# Patient Record
Sex: MALE | Race: WHITE | Employment: OTHER | URBAN - METROPOLITAN AREA
[De-identification: names, ages, dates, MRNs, and addresses within clinical notes are randomized per-mention and may not be internally consistent; named-entity substitution may affect disease eponyms.]

---

## 2017-01-05 ENCOUNTER — HOSPITAL ENCOUNTER (EMERGENCY)
Age: 77
Discharge: HOME | End: 2017-01-05
Attending: EMERGENCY MEDICINE
Payer: MEDICARE

## 2017-01-05 VITALS
SYSTOLIC BLOOD PRESSURE: 151 MMHG | BODY MASS INDEX: 30.63 KG/M2 | HEIGHT: 66 IN | TEMPERATURE: 97.5 F | DIASTOLIC BLOOD PRESSURE: 70 MMHG | WEIGHT: 190.6 LBS | RESPIRATION RATE: 16 BRPM | HEART RATE: 59 BPM | OXYGEN SATURATION: 96 %

## 2017-01-05 DIAGNOSIS — L29.9 GENERALIZED PRURITUS: Primary | ICD-10-CM

## 2017-01-05 PROCEDURE — 99282 EMERGENCY DEPT VISIT SF MDM: CPT

## 2017-01-05 RX ORDER — HYDROXYZINE 50 MG/1
50 TABLET, FILM COATED ORAL
Qty: 20 TAB | Refills: 0 | Status: SHIPPED | OUTPATIENT
Start: 2017-01-05 | End: 2017-01-15

## 2017-01-05 NOTE — DISCHARGE INSTRUCTIONS
We hope that we have addressed all of your medical concerns. The examination and treatment you received in the Emergency Department were for an emergent problem and were not intended as complete care. It is important that you follow up with your healthcare provider(s) for ongoing care. If your symptoms worsen or do not improve as expected, and you are unable to reach your usual health care provider(s), you should return to the Emergency Department. Today's healthcare is undergoing tremendous change, and patient satisfaction surveys are one of the many tools to assess the quality of medical care. You may receive a survey from the [employer] regarding your experience in the Emergency Department. I hope that your experience has been completely positive, particularly the medical care that I provided. As such, please participate in the survey; anything less than excellent does not meet my expectations or intentions. [facility] and [employer] participate in nationally recognized quality of care measures. If your blood pressure is greater than 120/80, as reported below, we urge that you seek medical care to address the potential of high blood pressure, commonly known as hypertension. Hypertension can be hereditary or can be caused by certain medical conditions, pain, stress, or \"white coat syndrome. \"       Please make an appointment with your health care provider(s) for follow up of your Emergency Department visit. VITALS:   Patient Vitals for the past 8 hrs:   Temp Pulse Resp BP SpO2   01/05/17 0323 97.5 °F (36.4 °C) (!) 59 16 151/70 96 %          Thank you for allowing us to provide you with medical care today. We realize that you have many choices for your emergency care needs. Please choose us in the future for any continued health care needs. Regards,           April LETTY Young, [facility]. Office: [phone]            No results found for this or any previous visit (from the past 24 hour(s)). No results found.

## 2017-01-05 NOTE — ED TRIAGE NOTES
Patient arrives to ED with c/o itching to his arm and torso, patient states the itching started @ 2 days ago, patient reports no new medications, also reports no new soap, or detergents, no other complaint noted.

## 2017-01-05 NOTE — ED PROVIDER NOTES
HPI Comments: This is a 77 yo  male with medical hx remarkable for arthritis, atrial fibrillation, colon polyps, depression, DM, GERD, hyperlipidemia, Vitamin D deficiency, BRITTANY and seizure disorder presenting ambulatory to the ED with complaint of a three day history of pruritis to the arms, back, chest and abdomen. Denies any recognized triggers. No new soaps, detergents, medications or recent travel. Has tried cortaid cream with limited relief. Improves with showers. No rash. No fever, headache, sore throat, cough, rhinorrhea, sneezing, SOB, abdominal pain, nausea, vomiting, or urinary complaints. Patient is a 78 y.o. male presenting with allergic reaction. The history is provided by the patient. Allergic Reaction    Pertinent negatives include no vomiting, no confusion and no shortness of breath. Past Medical History:   Diagnosis Date    Arrhythmia     Arthritis     Atrial fibrillation ([address]     Bladder spasms      PT UNAWARE OF THIS DIAGNOSIS ON 10/10/12    Colon polyp     Depression     DM (diabetes mellitus) ([address] 9/2/2011     DIET-CONTROLLED    GERD (gastroesophageal reflux disease) 6/21/2011     AFTER SPICY FOODS     Heart failure ([address]     HTN (hypertension) 6/21/2011    Hyperlipidemia 6/21/2011    Ill-defined condition      33 lb intentional wt loss over 18-24 months    Moderate vision loss of one eye      LEFT EYE VISION LOSS    BRITTANY (obstructive sleep apnea) 6/21/2011    Seizure disorder ([address] 10/10/12     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 rep 10 yrs.     Hx tonsillectomy      Hx heent       L EYE STRABISMUS CORRECTION    Hx hernia repair       UMBILICAL REPAIR WITH MESH    Vascular surgery procedure unlist  /P 2007     [facility] - vessels clear per pt         Family History:   Problem Relation Age of Onset    Heart Disease Mother     Diabetes Father     Heart Disease Father     Other Sister      Myasthenia Gravis    Heart Disease Brother     Heart Disease Maternal Grandmother     Heart Disease Maternal Grandfather        Social History     Social History    Marital status:      Spouse name: N/A    Number of children: N/A    Years of education: N/A     Occupational History    Not on file. Social History Main Topics    Smoking status: Former Smoker    Smokeless tobacco: Never Used      Comment: SMOKED A PIPE FOR 3-5 YEARS IN DISTANT PAST - over 30 yrs ago    Alcohol use 0.5 oz/week     1 Glasses of wine per week      Comment: weekly    Drug use: No    Sexual activity: Yes     Partners: Female     Other Topics Concern    Not on file     Social History Narrative         ALLERGIES: Ciprofloxacin; Penicillins; and Sulfa (sulfonamide antibiotics)    Review of Systems   Constitutional: Negative. Negative for chills and fever. HENT: Negative for congestion, ear pain, rhinorrhea, sore throat and voice change. Eyes: Negative. Negative for photophobia, pain and itching. Respiratory: Negative for cough, chest tightness and shortness of breath. Cardiovascular: Negative for chest pain and palpitations. Gastrointestinal: Negative for abdominal distention, abdominal pain, constipation, diarrhea and vomiting. Genitourinary: Negative for difficulty urinating, dysuria, frequency and urgency. Musculoskeletal: Negative for joint swelling and neck stiffness. Skin:        Itching     Neurological: Negative for weakness, numbness and headaches. Psychiatric/Behavioral: Negative for confusion and decreased concentration. All other systems reviewed and are negative.       Vitals:    01/05/17 0323   BP: 151/70   Pulse: (!) 59   Resp: 16   Temp: 97.5 °F (36.4 °C)   SpO2: 96%   Weight: 86.5 kg (190 lb 9.6 oz)   Height: 5' 6\" (1.676 m)            Physical Exam   Constitutional: He is oriented to person, place, and time. He appears well-developed and well-nourished. No distress.  male elderly in NAD   HENT:   Head: Normocephalic and atraumatic. Right Ear: External ear normal.   Left Ear: External ear normal.   Nose: Nose normal.   Mouth/Throat: Oropharynx is clear and moist. No oropharyngeal exudate. Eyes: Conjunctivae and EOM are normal. Pupils are equal, round, and reactive to light. Right eye exhibits no discharge. Left eye exhibits no discharge. Neck: Normal range of motion. Neck supple. Cardiovascular: Normal rate, regular rhythm and normal heart sounds. Pulmonary/Chest: Effort normal and breath sounds normal. He has no wheezes. He has no rales. Abdominal: Soft. Bowel sounds are normal. He exhibits no distension. There is no tenderness. There is no guarding. Musculoskeletal: Normal range of motion. Lymphadenopathy:     He has no cervical adenopathy. Neurological: He is alert and oriented to person, place, and time. No cranial nerve deficit. Skin: Skin is warm and dry. No rash noted. He is not diaphoretic. Seborrheic dermatitis to the back and abdomen  No lesions to web spaces, palms or soles    Psychiatric: He has a normal mood and affect. His behavior is normal.   Nursing note and vitals reviewed. MDM  Number of Diagnoses or Management Options  Generalized pruritus:   Diagnosis management comments: 77 yo  male with three day hx of generalized pruritis. No associated rash. Pt otherwise well appearing with relatively benign exam and stable vitals. Will treat with hydroxyzine. Pt has dermatology follow-up. [place], [place]      ED Course       Procedures         Progress note    Patient's results have been reviewed with them.   Patient and/or family have verbally conveyed their understanding and agreement of the patient's signs, symptoms, diagnosis, treatment and prognosis and additionally agree to follow up as recommended or return to the Emergency Room should their condition change prior to follow-up. Discharge instructions have also been provided to the patient with some educational information regarding their diagnosis as well a list of reasons why they would want to return to the ER prior to their follow-up appointment should their condition change. [facility], [address]    A/P  Generalized pruritis: Hydroxyzine as needed for itching. Follow-up with dermatology. Return for any new or worsening.  Coco CAMPOS [facility], [address]

## 2017-01-05 NOTE — ED NOTES
Pt discharged from department with prescription and follow up care instructions; pt verbalized understanding of discharge. VSS. NAD. Pt ambulatory from department with steady gait.

## 2017-05-19 PROBLEM — I15.2 HYPERTENSION COMPLICATING DIABETES (HCC): Status: ACTIVE | Noted: 2017-05-19

## 2017-05-19 PROBLEM — E11.59 HYPERTENSION COMPLICATING DIABETES (HCC): Status: ACTIVE | Noted: 2017-05-19

## 2017-07-31 PROBLEM — E03.9 ACQUIRED HYPOTHYROIDISM: Status: ACTIVE | Noted: 2017-07-31

## 2018-03-02 ENCOUNTER — HOSPITAL ENCOUNTER (OUTPATIENT)
Dept: GENERAL RADIOLOGY | Age: 78
Discharge: HOME | End: 2018-03-02
Attending: INTERNAL MEDICINE
Payer: MEDICARE

## 2018-03-02 DIAGNOSIS — E11.59 HYPERTENSION COMPLICATING DIABETES (HCC): ICD-10-CM

## 2018-03-02 DIAGNOSIS — I15.2 HYPERTENSION COMPLICATING DIABETES (HCC): ICD-10-CM

## 2018-03-02 PROBLEM — E11.21 TYPE 2 DIABETES WITH NEPHROPATHY (HCC): Status: ACTIVE | Noted: 2018-03-02

## 2018-03-02 PROCEDURE — 71046 X-RAY EXAM CHEST 2 VIEWS: CPT

## 2018-04-30 PROBLEM — Z79.899 ENCOUNTER FOR LONG-TERM (CURRENT) DRUG USE: Status: ACTIVE | Noted: 2018-04-30

## 2019-03-06 ENCOUNTER — HOSPITAL ENCOUNTER (OUTPATIENT)
Age: 79
Setting detail: OUTPATIENT SURGERY
Discharge: HOME | End: 2019-03-06
Attending: INTERNAL MEDICINE | Admitting: INTERNAL MEDICINE
Payer: MEDICARE

## 2019-03-06 ENCOUNTER — ANESTHESIA (OUTPATIENT)
Dept: ENDOSCOPY | Age: 79
End: 2019-03-06
Payer: MEDICARE

## 2019-03-06 ENCOUNTER — ANESTHESIA EVENT (OUTPATIENT)
Dept: ENDOSCOPY | Age: 79
End: 2019-03-06
Payer: MEDICARE

## 2019-03-06 VITALS
SYSTOLIC BLOOD PRESSURE: 142 MMHG | BODY MASS INDEX: 27.97 KG/M2 | OXYGEN SATURATION: 100 % | DIASTOLIC BLOOD PRESSURE: 75 MMHG | HEIGHT: 66 IN | WEIGHT: 174.06 LBS | TEMPERATURE: 97.7 F | HEART RATE: 72 BPM | RESPIRATION RATE: 13 BRPM

## 2019-03-06 LAB
GLUCOSE BLD STRIP.AUTO-MCNC: 171 MG/DL (ref 65–100)
SERVICE CMNT-IMP: ABNORMAL

## 2019-03-06 PROCEDURE — 74011250636 HC RX REV CODE- 250/636: Performed by: INTERNAL MEDICINE

## 2019-03-06 PROCEDURE — 88305 TISSUE EXAM BY PATHOLOGIST: CPT

## 2019-03-06 PROCEDURE — 74011250636 HC RX REV CODE- 250/636

## 2019-03-06 PROCEDURE — 77030027957 HC TBNG IRR ENDOGTR BUSS -B: Performed by: INTERNAL MEDICINE

## 2019-03-06 PROCEDURE — 76040000019: Performed by: INTERNAL MEDICINE

## 2019-03-06 PROCEDURE — 82962 GLUCOSE BLOOD TEST: CPT

## 2019-03-06 PROCEDURE — 77030013992 HC SNR POLYP ENDOSC BSC -B: Performed by: INTERNAL MEDICINE

## 2019-03-06 PROCEDURE — 76060000031 HC ANESTHESIA FIRST 0.5 HR: Performed by: INTERNAL MEDICINE

## 2019-03-06 RX ORDER — ATROPINE SULFATE 0.1 MG/ML
0.5 INJECTION INTRAVENOUS
Status: DISCONTINUED | OUTPATIENT
Start: 2019-03-06 | End: 2019-03-06 | Stop reason: HOSPADM

## 2019-03-06 RX ORDER — PROPOFOL 10 MG/ML
INJECTION, EMULSION INTRAVENOUS AS NEEDED
Status: DISCONTINUED | OUTPATIENT
Start: 2019-03-06 | End: 2019-03-06 | Stop reason: HOSPADM

## 2019-03-06 RX ORDER — EPINEPHRINE 0.1 MG/ML
1 INJECTION INTRACARDIAC; INTRAVENOUS
Status: DISCONTINUED | OUTPATIENT
Start: 2019-03-06 | End: 2019-03-06 | Stop reason: HOSPADM

## 2019-03-06 RX ORDER — SODIUM CHLORIDE 9 MG/ML
50 INJECTION, SOLUTION INTRAVENOUS CONTINUOUS
Status: DISCONTINUED | OUTPATIENT
Start: 2019-03-06 | End: 2019-03-06 | Stop reason: HOSPADM

## 2019-03-06 RX ORDER — NALOXONE HYDROCHLORIDE 0.4 MG/ML
0.4 INJECTION, SOLUTION INTRAMUSCULAR; INTRAVENOUS; SUBCUTANEOUS
Status: DISCONTINUED | OUTPATIENT
Start: 2019-03-06 | End: 2019-03-06 | Stop reason: HOSPADM

## 2019-03-06 RX ORDER — MIDAZOLAM HYDROCHLORIDE 1 MG/ML
.25-5 INJECTION, SOLUTION INTRAMUSCULAR; INTRAVENOUS
Status: DISCONTINUED | OUTPATIENT
Start: 2019-03-06 | End: 2019-03-06 | Stop reason: HOSPADM

## 2019-03-06 RX ORDER — FLUMAZENIL 0.1 MG/ML
0.2 INJECTION INTRAVENOUS
Status: DISCONTINUED | OUTPATIENT
Start: 2019-03-06 | End: 2019-03-06 | Stop reason: HOSPADM

## 2019-03-06 RX ORDER — FENTANYL CITRATE 50 UG/ML
100 INJECTION, SOLUTION INTRAMUSCULAR; INTRAVENOUS
Status: DISCONTINUED | OUTPATIENT
Start: 2019-03-06 | End: 2019-03-06 | Stop reason: HOSPADM

## 2019-03-06 RX ORDER — SODIUM CHLORIDE 0.9 % (FLUSH) 0.9 %
5-40 SYRINGE (ML) INJECTION AS NEEDED
Status: DISCONTINUED | OUTPATIENT
Start: 2019-03-06 | End: 2019-03-06 | Stop reason: HOSPADM

## 2019-03-06 RX ORDER — SODIUM CHLORIDE 9 MG/ML
INJECTION, SOLUTION INTRAVENOUS
Status: DISCONTINUED | OUTPATIENT
Start: 2019-03-06 | End: 2019-03-06 | Stop reason: HOSPADM

## 2019-03-06 RX ORDER — DEXTROMETHORPHAN/PSEUDOEPHED 2.5-7.5/.8
1.2 DROPS ORAL
Status: DISCONTINUED | OUTPATIENT
Start: 2019-03-06 | End: 2019-03-06 | Stop reason: HOSPADM

## 2019-03-06 RX ORDER — SODIUM CHLORIDE 0.9 % (FLUSH) 0.9 %
5-40 SYRINGE (ML) INJECTION EVERY 8 HOURS
Status: DISCONTINUED | OUTPATIENT
Start: 2019-03-06 | End: 2019-03-06 | Stop reason: HOSPADM

## 2019-03-06 RX ADMIN — PROPOFOL 30 MG: 10 INJECTION, EMULSION INTRAVENOUS at 11:02

## 2019-03-06 RX ADMIN — PROPOFOL 50 MG: 10 INJECTION, EMULSION INTRAVENOUS at 11:00

## 2019-03-06 RX ADMIN — PROPOFOL 50 MG: 10 INJECTION, EMULSION INTRAVENOUS at 11:12

## 2019-03-06 RX ADMIN — PROPOFOL 40 MG: 10 INJECTION, EMULSION INTRAVENOUS at 11:15

## 2019-03-06 RX ADMIN — PROPOFOL 50 MG: 10 INJECTION, EMULSION INTRAVENOUS at 11:07

## 2019-03-06 RX ADMIN — PROPOFOL 50 MG: 10 INJECTION, EMULSION INTRAVENOUS at 10:59

## 2019-03-06 RX ADMIN — PROPOFOL 20 MG: 10 INJECTION, EMULSION INTRAVENOUS at 11:04

## 2019-03-06 RX ADMIN — PROPOFOL 30 MG: 10 INJECTION, EMULSION INTRAVENOUS at 11:08

## 2019-03-06 RX ADMIN — PROPOFOL 50 MG: 10 INJECTION, EMULSION INTRAVENOUS at 11:06

## 2019-03-06 RX ADMIN — SODIUM CHLORIDE: 9 INJECTION, SOLUTION INTRAVENOUS at 10:59

## 2019-03-06 RX ADMIN — PROPOFOL 30 MG: 10 INJECTION, EMULSION INTRAVENOUS at 11:14

## 2019-03-06 NOTE — PROCEDURES
[address]         Procedure:  Colonoscopy    :  Se Israel MD    Surgical Assistant: None    Implants: None    Referring Provider: Claudeen Marvel, MD    Sedation:  MAC anesthesia Propofol      Prior to the procedure its objectives, risks, consequences and alternatives were discussed with the patient who then elected to proceed. The patient had the opportunity to ask questions and those questions were answered. A physical exam was performed. The heart, lungs, and mental status were examined prior to the procedure and found to be satisfactory for conscious sedation and for the procedure. Conscious sedation was initiated by the physician. Continuous pulse oximetry and blood pressure monitoring were used throughout the procedure. After appropriate analgesia and rectal exam, the colonoscope was passed into the anus and passed to the cecum without difficulty. The prep was fair. On slow withdrawal of the scope, there was an 8 mm sessile polyp in the cecum. The polyp was removed with snare and cautery and removed. The ascending, colon, hepatic flexure, transverse colon, splenic flexure and descending colon were normal. In the sigmoid there were moderate diverticulosis. The rectum was normal. Retroflexion exam of the rectum revealed internal hemorrhoids, which is probably the source of the bleeding. He tolerated the procedure without complication and I recommend a repeat colonoscopy in 5 years. Specimen Removed:  Cecal polyp    Complications: None. EBL:  None.         Se Israel MD  3/6/2019  11:20 AM

## 2019-03-06 NOTE — DISCHARGE INSTRUCTIONS
[address]  [address]          [place]  [id number]  1940    COLON DISCHARGE INSTRUCTIONS    DISCOMFORT:  Redness at IV site- apply warm compress to area; if redness or soreness persist- contact your physician  There may be a slight amount of blood passed from the rectum  Gaseous discomfort- walking, belching will help relieve any discomfort  You may not operate a vehicle for 12 hours  You may not engage in an occupation involving machinery or appliances for rest of today  You may not drink alcoholic beverages for at least 12 hours  Avoid making any critical decisions for at least 24 hour  DIET:   Regular diet. - however -  remember your colon is empty and a heavy meal will produce gas. Avoid these foods:  vegetables, fried / greasy foods, carbonated drinks for today         ACTIVITY:  You may resume your normal daily activities it is recommended that you spend the remainder of the day resting -  avoid any strenuous activity. CALL M.D. ANY SIGN OF:   Increasing pain, nausea, vomiting  Abdominal distension (swelling)  New increased bleeding (oral or rectal)  Fever (chills)  Pain in chest area  Bloody discharge from nose or mouth  Shortness of breath     Follow-up Instructions:   Call Dr. Norma Retana for any questions or problems. Telephone # [phone]  Biopsy results will be available in  5 to 7 days  Should have a repeat colonoscopy in 5 years    Impression:  diverticulosis, cecal polyp, hemorrhoids      Patient Education        Diverticulosis: Care Instructions  Your Care Instructions  In diverticulosis, pouches called diverticula form in the wall of the large intestine (colon). The pouches do not cause any pain or other symptoms. Most people who have diverticulosis do not know they have it. But the pouches sometimes bleed, and if they become infected, they can cause pain and other symptoms.  When this happens, it is called diverticulitis. Diverticula form when pressure pushes the wall of the colon outward at certain weak points. A diet that is too low in fiber can cause diverticula. Follow-up care is a key part of your treatment and safety. Be sure to make and go to all appointments, and call your doctor if you are having problems. It's also a good idea to know your test results and keep a list of the medicines you take. How can you care for yourself at home? · Include fruits, leafy green vegetables, beans, and whole grains in your diet each day. These foods are high in fiber. · Take a fiber supplement, such as Citrucel or Metamucil, every day if needed. Read and follow all instructions on the label. · Drink plenty of fluids, enough so that your urine is light yellow or clear like water. If you have kidney, heart, or liver disease and have to limit fluids, talk with your doctor before you increase the amount of fluids you drink. · Get at least 30 minutes of exercise on most days of the week. Walking is a good choice. You also may want to do other activities, such as running, swimming, cycling, or playing tennis or team sports. · Cut out foods that cause gas, pain, or other symptoms. When should you call for help? Call your doctor now or seek immediate medical care if:    · You have belly pain.     · You pass maroon or very bloody stools.     · You have a fever.     · You have nausea and vomiting.     · You have unusual changes in your bowel movements or abdominal swelling.     · You have burning pain when you urinate.     · You have abnormal vaginal discharge.     · You have shoulder pain.     · You have cramping pain that does not get better when you have a bowel movement or pass gas.     · You pass gas or stool from your urethra while urinating.    Watch closely for changes in your health, and be sure to contact your doctor if you have any problems. Where can you learn more?   Go to http://edith-marisol.info/. Enter N987 in the search box to learn more about \"Diverticulosis: Care Instructions. \"  Current as of: March 27, 2018  Content Version: 11.9  © 2006-2018 [employer]. Care instructions adapted under license by [employer] (which disclaims liability or warranty for this information). If you have questions about a medical condition or this instruction, always ask your healthcare professional. [place] [address] any warranty or liability for your use of this information. Patient Education        Hemorrhoids: Care Instructions  Your Care Instructions    Hemorrhoids are enlarged veins that develop in the anal canal. Bleeding during bowel movements, itching, swelling, and rectal pain are the most common symptoms. They can be uncomfortable at times, but hemorrhoids rarely are a serious problem. You can treat most hemorrhoids with simple changes to your diet and bowel habits. These changes include eating more fiber and not straining to pass stools. Most hemorrhoids do not need surgery or other treatment unless they are very large and painful or bleed a lot. Follow-up care is a key part of your treatment and safety. Be sure to make and go to all appointments, and call your doctor if you are having problems. It's also a good idea to know your test results and keep a list of the medicines you take. How can you care for yourself at home? · Sit in a few inches of warm water (sitz bath) 3 times a day and after bowel movements. The warm water helps with pain and itching. · Put ice on your anal area several times a day for 10 minutes at a time. Put a thin cloth between the ice and your skin. Follow this by placing a warm, wet towel on the area for another 10 to 20 minutes. · Take pain medicines exactly as directed. ? If the doctor gave you a prescription medicine for pain, take it as prescribed.   ? If you are not taking a prescription pain medicine, ask your doctor if you can take an over-the-counter medicine. · Keep the anal area clean, but be gentle. Use water and a fragrance-free soap, such as [place], or use baby wipes or medicated pads, such as Tucks. · Wear cotton underwear and loose clothing to decrease moisture in the anal area. · Eat more fiber. Include foods such as whole-grain breads and cereals, raw vegetables, raw and dried fruits, and beans. · Drink plenty of fluids, enough so that your urine is light yellow or clear like water. If you have kidney, heart, or liver disease and have to limit fluids, talk with your doctor before you increase the amount of fluids you drink. · Use a stool softener that contains bran or psyllium. You can save money by buying bran or psyllium (available in bulk at most health food stores) and sprinkling it on foods or stirring it into fruit juice. Or you can use a product such as Metamucil or Hydrocil. · Practice healthy bowel habits. ? Go to the bathroom as soon as you have the urge. ? Avoid straining to pass stools. Relax and give yourself time to let things happen naturally. ? Do not hold your breath while passing stools. ? Do not read while sitting on the toilet. Get off the toilet as soon as you have finished. · Take your medicines exactly as prescribed. Call your doctor if you think you are having a problem with your medicine. When should you call for help? Call 911 anytime you think you may need emergency care. For example, call if:    · You pass maroon or very bloody stools.    Call your doctor now or seek immediate medical care if:    · You have increased pain.     · You have increased bleeding.    Watch closely for changes in your health, and be sure to contact your doctor if:    · Your symptoms have not improved after 3 or 4 days. Where can you learn more? Go to http://edith-marisol.info/.   Enter F228 in the search box to learn more about \"Hemorrhoids: Care Instructions. \"  Current as of: March 27, 2018  Content Version: 11.9  © 8464-1944 [employer]. Care instructions adapted under license by [employer] (which disclaims liability or warranty for this information). If you have questions about a medical condition or this instruction, always ask your healthcare professional. [address] any warranty or liability for your use of this information. Patient Education        Colon Polyps: Care Instructions  Your Care Instructions    Colon polyps are growths in the colon or the rectum. The cause of most colon polyps is not known, and most people who get them do not have any problems. But a certain kind can turn into cancer. For this reason, regular testing for colon polyps is important for people age 48 and older and anyone who has an increased risk for colon cancer. Polyps are usually found through routine colon cancer screening tests. Although most colon polyps are not cancerous, they are usually removed and then tested for cancer. Screening for colon cancer saves lives because the cancer can usually be cured if it is caught early. If you have a polyp that is the type that can turn into cancer, you may need more tests to examine your entire colon. The doctor will remove any other polyps that he or she finds, and you will be tested more often. Follow-up care is a key part of your treatment and safety. Be sure to make and go to all appointments, and call your doctor if you are having problems. It's also a good idea to know your test results and keep a list of the medicines you take. How can you care for yourself at home? Regular exams to look for colon polyps are the best way to prevent polyps from turning into colon cancer. These can include stool tests, sigmoidoscopy, colonoscopy, and CT colonography. Talk with your doctor about a testing schedule that is right for you.   To prevent polyps  There is no home treatment that can prevent colon polyps. But these steps may help lower your risk for cancer. · Stay active. Being active can help you get to and stay at a healthy weight. Try to exercise on most days of the week. Walking is a good choice. · Eat well. Choose a variety of vegetables, fruits, legumes (such as peas and beans), fish, poultry, and whole grains. · Do not smoke. If you need help quitting, talk to your doctor about stop-smoking programs and medicines. These can increase your chances of quitting for good. · If you drink alcohol, limit how much you drink. Limit alcohol to 2 drinks a day for men and 1 drink a day for women. When should you call for help? Call your doctor now or seek immediate medical care if:    · You have severe belly pain.     · Your stools are maroon or very bloody.    Watch closely for changes in your health, and be sure to contact your doctor if:    · You have a fever.     · You have nausea or vomiting.     · You have a change in bowel habits (new constipation or diarrhea).     · Your symptoms get worse or are not improving as expected. Where can you learn more? Go to http://edith-marisol.info/. Enter 95 [id number] in the search box to learn more about \"Colon Polyps: Care Instructions. \"  Current as of: March 27, 2018  Content Version: 11.9  © 7232-2300 [employer]. Care instructions adapted under license by [employer] (which disclaims liability or warranty for this information). If you have questions about a medical condition or this instruction, always ask your healthcare professional. [place] [address] any warranty or liability for your use of this information.

## 2019-03-06 NOTE — ANESTHESIA PREPROCEDURE EVALUATION
Anesthetic History   No history of anesthetic complications            Review of Systems / Medical History  Patient summary reviewed, nursing notes reviewed and pertinent labs reviewed    Pulmonary  Within defined limits      Sleep apnea           Neuro/Psych   Within defined limits           Cardiovascular  Within defined limits  Hypertension        Dysrhythmias : atrial fibrillation           GI/Hepatic/Renal  Within defined limits   GERD           Endo/Other  Within defined limits  Diabetes: well controlled, type 2    Arthritis     Other Findings              Physical Exam    Airway  Mallampati: II  TM Distance: > 6 cm  Neck ROM: normal range of motion   Mouth opening: Normal     Cardiovascular  Regular rate and rhythm,  S1 and S2 normal,  no murmur, click, rub, or gallop             Dental  No notable dental hx       Pulmonary  Breath sounds clear to auscultation               Abdominal  GI exam deferred       Other Findings            Anesthetic Plan    ASA: 3  Anesthesia type: MAC          Induction: Intravenous  Anesthetic plan and risks discussed with: Patient

## 2019-03-06 NOTE — ANESTHESIA POSTPROCEDURE EVALUATION
Procedure(s):  COLONOSCOPY  ENDOSCOPIC POLYPECTOMY.    <BSHSIANPOST>    Visit Vitals  /57   Pulse 72   Temp 36.5 °C (97.7 °F)   Resp 13   Ht 5' 6\" (1.676 m)   Wt 79 kg (174 lb 1 oz)   SpO2 99%   BMI 28.09 kg/m²

## 2019-03-06 NOTE — H&P
[address]  174 [facility], [address] [place] [address]                 Rosita Samson is a  70 y.o.  male who presents with blood in his stool for evaluation . Past Medical History:   Diagnosis Date    Acquired hypothyroidism 7/31/2017    Arrhythmia     Arthritis     Atrial fibrillation (HCC)     Colon polyp     Depression     DM (diabetes mellitus) ([facility] [address] 9/2/2011    DIET-CONTROLLED    GERD (gastroesophageal reflux disease) 6/21/2011    AFTER SPICY FOODS     Heart failure ([facility] [address]     HTN (hypertension) 6/21/2011    Hyperlipidemia 6/21/2011    Ill-defined condition     33 lb intentional wt loss over 18-24 months    Moderate vision loss of one eye     LEFT EYE VISION LOSS    BRITTANY on CPAP     Unspecified sleep apnea     Uses CPAP    Vitamin D deficiency 1/28/2014     Past Surgical History:   Procedure Laterality Date    ENDOSCOPY, COLON, DIAGNOSTIC  10/09    neg. rep 10 yrs.     HX HEENT      L EYE STRABISMUS CORRECTION    HX HERNIA REPAIR      UMBILICAL REPAIR WITH MESH    HX TONSILLECTOMY      HX VASECTOMY      VASCULAR SURGERY PROCEDURE UNLIST  /P 2007    CARDIAC CATH DONE AT [facility] - vessels clear per pt     Allergies   Allergen Reactions    Ciprofloxacin Hives     10 yrs ago, unsure if it was Cipro    Penicillins Swelling    Sulfa (Sulfonamide Antibiotics) Rash     Current Facility-Administered Medications   Medication Dose Route Frequency Provider Last Rate Last Dose    0.9% sodium chloride infusion  50 mL/hr IntraVENous CONTINUOUS Vazquez Villar MD 50 mL/hr at 03/06/19 1034 50 mL/hr at 03/06/19 1034    sodium chloride (NS) flush 5-40 mL  5-40 mL IntraVENous Q8H Vazquez Villar MD        sodium chloride (NS) flush 5-40 mL  5-40 mL IntraVENous PRN Vazquez Villar MD        midazolam (VERSED) injection 0.25-5 mg  0.25-5 mg IntraVENous Multiple Vazquez Villar MD        fentaNYL citrate (PF) injection 100 mcg  100 mcg IntraVENous Multiple Alda Pearl MD [facility]        naloxone [facility]) injection 0.4 mg  0.4 mg IntraVENous Dayne Zavala MD        flumazenil (ROMAZICON) 0.1 mg/mL injection 0.2 mg  0.2 mg IntraVENous Dayne Zavala MD        [facility]) 12WD/2.5DE oral drops 80 mg  1.2 mL Oral Dayne Zavala MD        atropine injection 0.5 mg  0.5 mg IntraVENous ONCE PRN Maureen Zavala MD        EPINEPHrine (ADRENALIN) 0.1 mg/mL syringe 1 mg  1 mg Endoscopically ONCE PRN Maureen Zavala MD           Visit Vitals  /66   Pulse 73   Temp 98.2 °F (36.8 °C)   Resp 14   Ht 5' 6\" (1.676 m)   Wt 79 kg (174 lb 1 oz)   SpO2 100%   BMI 28.09 kg/m²           PHYSICAL EXAM:  General: WD, WN. Alert, cooperative, no acute distress    HEENT: NC, Atraumatic. PERRLA, EOMI. Anicteric sclerae. Mallampati score 2  Lungs:  CTA Bilaterally. No Wheezing/Rhonchi/Rales. Heart:  Regular  rhythm,  No murmur (), No Rubs, No Gallops  Abdomen: Soft, Non distended, Non tender.  +Bowel sounds, no HSM  Extremities: No c/c/e  Neurologic:  CN 2-12 gi, Alert and oriented X 3. No acute neurological distress   Psych:   Good insight. Not anxious nor agitated. Plan:   Endoscopic procedure with MAC.     Kassy Artis MD  3/6/2019  10:58 AM

## 2019-04-01 ENCOUNTER — HOSPITAL ENCOUNTER (OUTPATIENT)
Dept: ULTRASOUND IMAGING | Age: 79
Discharge: HOME | End: 2019-04-01
Attending: INTERNAL MEDICINE
Payer: MEDICARE

## 2019-04-01 DIAGNOSIS — N17.9 ACUTE KIDNEY FAILURE, UNSPECIFIED (HCC): ICD-10-CM

## 2019-04-01 PROCEDURE — 76770 US EXAM ABDO BACK WALL COMP: CPT

## 2021-09-07 ENCOUNTER — APPOINTMENT (OUTPATIENT)
Dept: CT IMAGING | Age: 81
End: 2021-09-07
Attending: PHYSICIAN ASSISTANT
Payer: MEDICARE

## 2021-09-07 ENCOUNTER — HOSPITAL ENCOUNTER (EMERGENCY)
Age: 81
Discharge: HOME | End: 2021-09-07
Attending: EMERGENCY MEDICINE
Payer: MEDICARE

## 2021-09-07 VITALS
SYSTOLIC BLOOD PRESSURE: 144 MMHG | TEMPERATURE: 98 F | RESPIRATION RATE: 18 BRPM | HEIGHT: 66 IN | OXYGEN SATURATION: 99 % | BODY MASS INDEX: 29.57 KG/M2 | HEART RATE: 74 BPM | WEIGHT: 184 LBS | DIASTOLIC BLOOD PRESSURE: 78 MMHG

## 2021-09-07 DIAGNOSIS — K59.00 CONSTIPATION, UNSPECIFIED CONSTIPATION TYPE: Primary | ICD-10-CM

## 2021-09-07 LAB
ALBUMIN SERPL-MCNC: 4.2 G/DL (ref 3.5–5)
ALBUMIN/GLOB SERPL: 1.3 {RATIO} (ref 1.1–2.2)
ALP SERPL-CCNC: 99 U/L (ref 45–117)
ALT SERPL-CCNC: 28 U/L (ref 12–78)
ANION GAP SERPL CALC-SCNC: 7 MMOL/L (ref 5–15)
APPEARANCE UR: CLEAR
AST SERPL-CCNC: 18 U/L (ref 15–37)
BACTERIA URNS QL MICRO: NEGATIVE /HPF
BASOPHILS # BLD: 0.1 K/UL (ref 0–0.1)
BASOPHILS NFR BLD: 1 % (ref 0–1)
BILIRUB SERPL-MCNC: 0.7 MG/DL (ref 0.2–1)
BILIRUB UR QL: NEGATIVE
BUN SERPL-MCNC: 32 MG/DL (ref 6–20)
BUN/CREAT SERPL: 23 (ref 12–20)
CALCIUM SERPL-MCNC: 9.5 MG/DL (ref 8.5–10.1)
CHLORIDE SERPL-SCNC: 98 MMOL/L (ref 97–108)
CO2 SERPL-SCNC: 28 MMOL/L (ref 21–32)
COLOR UR: NORMAL
COMMENT, HOLDF: NORMAL
CREAT SERPL-MCNC: 1.37 MG/DL (ref 0.7–1.3)
DIFFERENTIAL METHOD BLD: ABNORMAL
EOSINOPHIL # BLD: 0 K/UL (ref 0–0.4)
EOSINOPHIL NFR BLD: 1 % (ref 0–7)
EPITH CASTS URNS QL MICRO: NORMAL /LPF
ERYTHROCYTE [DISTWIDTH] IN BLOOD BY AUTOMATED COUNT: 11.5 % (ref 11.5–14.5)
GLOBULIN SER CALC-MCNC: 3.3 G/DL (ref 2–4)
GLUCOSE SERPL-MCNC: 129 MG/DL (ref 65–100)
GLUCOSE UR STRIP.AUTO-MCNC: NEGATIVE MG/DL
HCT VFR BLD AUTO: 43.3 % (ref 36.6–50.3)
HGB BLD-MCNC: 15.2 G/DL (ref 12.1–17)
HGB UR QL STRIP: NEGATIVE
HYALINE CASTS URNS QL MICRO: NORMAL /LPF (ref 0–5)
IMM GRANULOCYTES # BLD AUTO: 0 K/UL (ref 0–0.04)
IMM GRANULOCYTES NFR BLD AUTO: 0 % (ref 0–0.5)
KETONES UR QL STRIP.AUTO: NEGATIVE MG/DL
LEUKOCYTE ESTERASE UR QL STRIP.AUTO: NEGATIVE
LYMPHOCYTES # BLD: 2 K/UL (ref 0.8–3.5)
LYMPHOCYTES NFR BLD: 23 % (ref 12–49)
MCH RBC QN AUTO: 31.7 PG (ref 26–34)
MCHC RBC AUTO-ENTMCNC: 35.1 G/DL (ref 30–36.5)
MCV RBC AUTO: 90.2 FL (ref 80–99)
MONOCYTES # BLD: 1.1 K/UL (ref 0–1)
MONOCYTES NFR BLD: 12 % (ref 5–13)
NEUTS SEG # BLD: 5.6 K/UL (ref 1.8–8)
NEUTS SEG NFR BLD: 63 % (ref 32–75)
NITRITE UR QL STRIP.AUTO: NEGATIVE
NRBC # BLD: 0 K/UL (ref 0–0.01)
NRBC BLD-RTO: 0 PER 100 WBC
PH UR STRIP: 5 [PH] (ref 5–8)
PLATELET # BLD AUTO: 223 K/UL (ref 150–400)
PMV BLD AUTO: 10.3 FL (ref 8.9–12.9)
POTASSIUM SERPL-SCNC: 4.7 MMOL/L (ref 3.5–5.1)
PROT SERPL-MCNC: 7.5 G/DL (ref 6.4–8.2)
PROT UR STRIP-MCNC: NEGATIVE MG/DL
RBC # BLD AUTO: 4.8 M/UL (ref 4.1–5.7)
RBC #/AREA URNS HPF: NORMAL /HPF (ref 0–5)
SAMPLES BEING HELD,HOLD: NORMAL
SODIUM SERPL-SCNC: 133 MMOL/L (ref 136–145)
SP GR UR REFRACTOMETRY: 1.01 (ref 1–1.03)
UR CULT HOLD, URHOLD: NORMAL
UROBILINOGEN UR QL STRIP.AUTO: 0.2 EU/DL (ref 0.2–1)
WBC # BLD AUTO: 8.9 K/UL (ref 4.1–11.1)
WBC URNS QL MICRO: NORMAL /HPF (ref 0–4)

## 2021-09-07 PROCEDURE — 74011000636 HC RX REV CODE- 636: Performed by: EMERGENCY MEDICINE

## 2021-09-07 PROCEDURE — 81001 URINALYSIS AUTO W/SCOPE: CPT

## 2021-09-07 PROCEDURE — 85025 COMPLETE CBC W/AUTO DIFF WBC: CPT

## 2021-09-07 PROCEDURE — 36415 COLL VENOUS BLD VENIPUNCTURE: CPT

## 2021-09-07 PROCEDURE — 80053 COMPREHEN METABOLIC PANEL: CPT

## 2021-09-07 PROCEDURE — 74177 CT ABD & PELVIS W/CONTRAST: CPT

## 2021-09-07 PROCEDURE — 99282 EMERGENCY DEPT VISIT SF MDM: CPT

## 2021-09-07 RX ORDER — GLYCERIN ADULT
1 SUPPOSITORY, RECTAL RECTAL
Qty: 10 SUPPOSITORY | Refills: 0 | Status: SHIPPED | OUTPATIENT
Start: 2021-09-07

## 2021-09-07 RX ORDER — MAGNESIUM CITRATE
296 SOLUTION, ORAL ORAL ONCE
Qty: 296 ML | Refills: 1 | Status: SHIPPED | OUTPATIENT
Start: 2021-09-07 | End: 2021-09-07

## 2021-09-07 RX ADMIN — IOPAMIDOL 100 ML: 755 INJECTION, SOLUTION INTRAVENOUS at 15:17

## 2021-09-07 NOTE — ED PROVIDER NOTES
79 y/o male presenting with complaint of constipation. The patient reports an onset of constipation 5 days ago with associated abdominal distention and decreased appetite. He has tried Ex-Lax with some relief initially, but has not had any bowel movements for the past 2 days. He presents to the ED today due to concerns for possible bowel obstruction. No fevers, chest pain, shortness of breath, nausea, vomiting, or bloody stool. The history is provided by the patient. Past Medical History:   Diagnosis Date    Acquired hypothyroidism 7/31/2017    Arrhythmia     Arthritis     Atrial fibrillation (HCC)     Colon polyp     Depression     DM (diabetes mellitus) ([facility] [address] 9/2/2011    DIET-CONTROLLED    GERD (gastroesophageal reflux disease) 6/21/2011    AFTER SPICY FOODS     Heart failure ([facility] [address]     HTN (hypertension) 6/21/2011    Hyperlipidemia 6/21/2011    Ill-defined condition     33 lb intentional wt loss over 18-24 months    Moderate vision loss of one eye     LEFT EYE VISION LOSS    BRITTANY on CPAP     Unspecified sleep apnea     Uses CPAP    Vitamin D deficiency 1/28/2014       Past Surgical History:   Procedure Laterality Date    COLONOSCOPY N/A 3/6/2019    COLONOSCOPY performed by Subhash Short MD at [facility]. [facility] [address], DIAGNOSTIC  10/09    neg. rep 10 yrs.     HX HEENT      L EYE STRABISMUS CORRECTION    HX HERNIA REPAIR      UMBILICAL REPAIR WITH MESH    HX TONSILLECTOMY      HX VASECTOMY      VASCULAR SURGERY PROCEDURE UNLIST  /P 2007    CARDIAC CATH DONE AT [address] [place] [address] - vessels clear per pt         Family History:   Problem Relation Age of Onset    Heart Disease Mother     Diabetes Father     Heart Disease Father     Other Sister         Myasthenia Gravis    Heart Disease Brother     Heart Disease Maternal Grandmother     Heart Disease Maternal Grandfather        Social History     Socioeconomic History    Marital status:      Spouse name: Not on file    Number of children: Not on file    Years of education: Not on file    Highest education level: Not on file   Occupational History    Not on file   Tobacco Use    Smoking status: Former Smoker    Smokeless tobacco: Never Used    Tobacco comment: SMOKED A PIPE FOR 3-5 YEARS IN DISTANT PAST - over 30 yrs ago   Substance and Sexual Activity    Alcohol use: Yes     Alcohol/week: 0.8 standard drinks     Types: 1 Glasses of wine per week     Comment: weekly    Drug use: No    Sexual activity: Yes     Partners: Female   Other Topics Concern    Not on file   Social History Narrative    Not on file     Social Determinants of Health     Financial Resource Strain:     Difficulty of Paying Living Expenses:    Food Insecurity:     Worried About Running Out of Food in the Last Year:     [address] in the Last Year:    Transportation Needs:     Lack of Transportation (Medical):  Lack of Transportation (Non-Medical):    Physical Activity:     Days of Exercise per Week:     Minutes of Exercise per Session:    Stress:     Feeling of Stress :    Social Connections:     Frequency of Communication with Friends and Family:     Frequency of Social Gatherings with Friends and Family:     Attends Sabianist Services:     Active Member of Clubs or Organizations:     Attends Club or Organization Meetings:     Marital Status:    Intimate Partner Violence:     Fear of Current or Ex-Partner:     Emotionally Abused:     Physically Abused:     Sexually Abused: ALLERGIES: Ciprofloxacin, Penicillins, and Sulfa (sulfonamide antibiotics)    Review of Systems   Constitutional: Positive for appetite change (decreased). Negative for chills and fever. Respiratory: Negative for shortness of breath. Cardiovascular: Negative for chest pain. Gastrointestinal: Positive for abdominal pain and constipation. Negative for blood in stool, nausea and vomiting.    Musculoskeletal: Negative for arthralgias and myalgias. Neurological: Negative for syncope and light-headedness. All other systems reviewed and are negative. Vitals:    09/07/21 1039   BP: (!) 150/83   Pulse: 71   Resp: 18   Temp: 97.6 °F (36.4 °C)   SpO2: 98%   Weight: 83.5 kg (184 lb)   Height: 5' 6\" (1.676 m)            Physical Exam  Vitals and nursing note reviewed. Constitutional:       General: He is not in acute distress. Appearance: He is well-developed. He is not diaphoretic. HENT:      Head: Normocephalic and atraumatic. Eyes:      Conjunctiva/sclera: Conjunctivae normal.   Cardiovascular:      Rate and Rhythm: Normal rate. Rhythm irregular. Pulmonary:      Effort: Pulmonary effort is normal.      Breath sounds: Normal breath sounds. Abdominal:      General: Bowel sounds are normal. There is distension. Palpations: Abdomen is soft. Tenderness: There is no abdominal tenderness. There is no guarding. Skin:     General: Skin is warm and dry. Neurological:      Mental Status: He is alert and oriented to person, place, and time. MDM       Procedures        81 y/o male presenting with complaint of constipation. The patient is well-appearing in no acute distress, abdominal exam benign. Labs reveal mild renal dysfunction, improved from prior, but are otherwise unremarkable. UA negative for infection. CT abd/pelvis reveals moderate stool burden without distention, no evidence of bowel obstruction. Plan is for discharge home with Rx for mag citrate and glycerin suppositories. PCP follow up if symptoms continue. Strict ED return precautions discussed and provided in writing at time of discharge. The patient verbalized understanding and agreement with this plan.

## 2021-09-07 NOTE — ED NOTES
Triage: Patient reports not having a bowel movement since Sunday. Has to take stool softeners or laxatives to have BM. Patient has sensation of bloating which is causing him to not eat and drink as normal. Formerly has been seen by Dr. Maame Yu from GI (retired since pts last colonoscopy).

## 2022-03-19 PROBLEM — Z79.899 ENCOUNTER FOR LONG-TERM (CURRENT) DRUG USE: Status: ACTIVE | Noted: 2018-04-30

## 2022-03-19 PROBLEM — I15.2 HYPERTENSION COMPLICATING DIABETES (HCC): Status: ACTIVE | Noted: 2017-05-19

## 2022-03-19 PROBLEM — E03.9 ACQUIRED HYPOTHYROIDISM: Status: ACTIVE | Noted: 2017-07-31

## 2022-03-19 PROBLEM — E11.9 HYPERTENSION COMPLICATING DIABETES (HCC): Status: ACTIVE | Noted: 2017-05-19

## 2022-03-19 PROBLEM — E11.59 HYPERTENSION COMPLICATING DIABETES (HCC): Status: ACTIVE | Noted: 2017-05-19

## 2022-03-19 PROBLEM — E11.21 TYPE 2 DIABETES WITH NEPHROPATHY (HCC): Status: ACTIVE | Noted: 2018-03-02

## 2022-03-19 PROBLEM — I10 HYPERTENSION COMPLICATING DIABETES (HCC): Status: ACTIVE | Noted: 2017-05-19

## 2024-05-05 ENCOUNTER — HOSPITAL ENCOUNTER (EMERGENCY)
Facility: HOSPITAL | Age: 84
Discharge: HOME | End: 2024-05-08
Attending: STUDENT IN AN ORGANIZED HEALTH CARE EDUCATION/TRAINING PROGRAM
Payer: COMMERCIAL

## 2024-05-05 DIAGNOSIS — F32.A DEPRESSION, UNSPECIFIED DEPRESSION TYPE: Primary | ICD-10-CM

## 2024-05-05 LAB
ALBUMIN SERPL-MCNC: 3.4 G/DL (ref 3.5–5)
ALBUMIN/GLOB SERPL: 0.9 (ref 1.1–2.2)
ALP SERPL-CCNC: 141 U/L (ref 45–117)
ALT SERPL-CCNC: 25 U/L (ref 12–78)
AMPHET UR QL SCN: NEGATIVE
ANION GAP SERPL CALC-SCNC: 5 MMOL/L (ref 5–15)
APAP SERPL-MCNC: <2 UG/ML (ref 10–30)
AST SERPL-CCNC: 18 U/L (ref 15–37)
BARBITURATES UR QL SCN: NEGATIVE
BASOPHILS # BLD: 0.1 K/UL (ref 0–0.1)
BASOPHILS NFR BLD: 1 % (ref 0–1)
BENZODIAZ UR QL: NEGATIVE
BILIRUB SERPL-MCNC: 0.5 MG/DL (ref 0.2–1)
BUN SERPL-MCNC: 15 MG/DL (ref 6–20)
BUN/CREAT SERPL: 12 (ref 12–20)
CALCIUM SERPL-MCNC: 9.3 MG/DL (ref 8.5–10.1)
CANNABINOIDS UR QL SCN: NEGATIVE
CHLORIDE SERPL-SCNC: 103 MMOL/L (ref 97–108)
CO2 SERPL-SCNC: 26 MMOL/L (ref 21–32)
COCAINE UR QL SCN: NEGATIVE
COMMENT:: NORMAL
CREAT SERPL-MCNC: 1.24 MG/DL (ref 0.7–1.3)
DIFFERENTIAL METHOD BLD: ABNORMAL
EOSINOPHIL # BLD: 0.1 K/UL (ref 0–0.4)
EOSINOPHIL NFR BLD: 1 % (ref 0–7)
ERYTHROCYTE [DISTWIDTH] IN BLOOD BY AUTOMATED COUNT: 12.2 % (ref 11.5–14.5)
ETHANOL SERPL-MCNC: <10 MG/DL (ref 0–0.08)
GLOBULIN SER CALC-MCNC: 3.8 G/DL (ref 2–4)
GLUCOSE SERPL-MCNC: 214 MG/DL (ref 65–100)
HCT VFR BLD AUTO: 40.8 % (ref 36.6–50.3)
HGB BLD-MCNC: 14.4 G/DL (ref 12.1–17)
IMM GRANULOCYTES # BLD AUTO: 0.1 K/UL (ref 0–0.04)
IMM GRANULOCYTES NFR BLD AUTO: 1 % (ref 0–0.5)
LYMPHOCYTES # BLD: 1.2 K/UL (ref 0.8–3.5)
LYMPHOCYTES NFR BLD: 13 % (ref 12–49)
Lab: NORMAL
MCH RBC QN AUTO: 32.1 PG (ref 26–34)
MCHC RBC AUTO-ENTMCNC: 35.3 G/DL (ref 30–36.5)
MCV RBC AUTO: 91.1 FL (ref 80–99)
METHADONE UR QL: NEGATIVE
MONOCYTES # BLD: 0.8 K/UL (ref 0–1)
MONOCYTES NFR BLD: 8 % (ref 5–13)
NEUTS SEG # BLD: 7 K/UL (ref 1.8–8)
NEUTS SEG NFR BLD: 76 % (ref 32–75)
NRBC # BLD: 0 K/UL (ref 0–0.01)
NRBC BLD-RTO: 0 PER 100 WBC
OPIATES UR QL: NEGATIVE
PCP UR QL: NEGATIVE
PLATELET # BLD AUTO: 266 K/UL (ref 150–400)
PMV BLD AUTO: 9.8 FL (ref 8.9–12.9)
POTASSIUM SERPL-SCNC: 3.9 MMOL/L (ref 3.5–5.1)
PROT SERPL-MCNC: 7.2 G/DL (ref 6.4–8.2)
RBC # BLD AUTO: 4.48 M/UL (ref 4.1–5.7)
SALICYLATES SERPL-MCNC: <1.7 MG/DL (ref 2.8–20)
SODIUM SERPL-SCNC: 134 MMOL/L (ref 136–145)
SPECIMEN HOLD: NORMAL
SPECIMEN HOLD: NORMAL
WBC # BLD AUTO: 9.3 K/UL (ref 4.1–11.1)

## 2024-05-05 PROCEDURE — 80143 DRUG ASSAY ACETAMINOPHEN: CPT

## 2024-05-05 PROCEDURE — 80179 DRUG ASSAY SALICYLATE: CPT

## 2024-05-05 PROCEDURE — 99284 EMERGENCY DEPT VISIT MOD MDM: CPT

## 2024-05-05 PROCEDURE — 93005 ELECTROCARDIOGRAM TRACING: CPT | Performed by: STUDENT IN AN ORGANIZED HEALTH CARE EDUCATION/TRAINING PROGRAM

## 2024-05-05 PROCEDURE — 82077 ASSAY SPEC XCP UR&BREATH IA: CPT

## 2024-05-05 PROCEDURE — 85025 COMPLETE CBC W/AUTO DIFF WBC: CPT

## 2024-05-05 PROCEDURE — 90791 PSYCH DIAGNOSTIC EVALUATION: CPT

## 2024-05-05 PROCEDURE — 80053 COMPREHEN METABOLIC PANEL: CPT

## 2024-05-05 PROCEDURE — 80307 DRUG TEST PRSMV CHEM ANLYZR: CPT

## 2024-05-05 PROCEDURE — 36415 COLL VENOUS BLD VENIPUNCTURE: CPT

## 2024-05-05 PROCEDURE — 6370000000 HC RX 637 (ALT 250 FOR IP): Performed by: EMERGENCY MEDICINE

## 2024-05-05 RX ORDER — LANOLIN ALCOHOL/MO/W.PET/CERES
6 CREAM (GRAM) TOPICAL
Status: COMPLETED | OUTPATIENT
Start: 2024-05-05 | End: 2024-05-05

## 2024-05-05 RX ORDER — ONDANSETRON 4 MG/1
4 TABLET, ORALLY DISINTEGRATING ORAL ONCE
Status: COMPLETED | OUTPATIENT
Start: 2024-05-05 | End: 2024-05-05

## 2024-05-05 RX ADMIN — ONDANSETRON 4 MG: 4 TABLET, ORALLY DISINTEGRATING ORAL at 22:05

## 2024-05-05 RX ADMIN — Medication 6 MG: at 20:51

## 2024-05-05 ASSESSMENT — PAIN - FUNCTIONAL ASSESSMENT: PAIN_FUNCTIONAL_ASSESSMENT: NONE - DENIES PAIN

## 2024-05-05 NOTE — ED PROVIDER NOTES
Care signed out to me by previous physician pending monitoring and follow-up on the patient's medical screening labs.  Please see additional notes for details.    Labs are reassuring.  Patient medically cleared.  Psychiatry will be looking for a bed and will continue to monitor     Ralph Live MD  05/05/24 1926    Again assumed care of the patient as a behavioral health hold.  No acute events occurred during my shift.  Bed search ongoing     Ralph Live MD  05/06/24 6121    Again assumed care of this patient as a behavioral health hold.  Apparently the patient has been cleared by psychiatry and this occurred yesterday.  There was concern over a partially constructed firearm at his home. [facility] has already made a plan with the patient and nonemergent will go over to his house to quarantine the firearm.  Patient will be starting a partial hospitalization program and was discharged in stable condition     Ralph Live MD  05/08/24 9894

## 2024-05-05 NOTE — ED TRIAGE NOTES
Pt arrives via EMS c/o depression and feelings of hopelessness. Pt arrives tearful. Pt took an extra dose of his prozac today for a total of 160mg. Per EMS pt does have a fire arm at his house. Pt ambulatory and GCS 15

## 2024-05-06 LAB
EKG ATRIAL RATE: 65 BPM
EKG DIAGNOSIS: NORMAL
EKG P AXIS: 36 DEGREES
EKG P-R INTERVAL: 198 MS
EKG Q-T INTERVAL: 436 MS
EKG QRS DURATION: 92 MS
EKG QTC CALCULATION (BAZETT): 453 MS
EKG R AXIS: 16 DEGREES
EKG T AXIS: 46 DEGREES
EKG VENTRICULAR RATE: 65 BPM
GLUCOSE BLD STRIP.AUTO-MCNC: 195 MG/DL (ref 65–117)
SERVICE CMNT-IMP: ABNORMAL

## 2024-05-06 PROCEDURE — 6370000000 HC RX 637 (ALT 250 FOR IP): Performed by: EMERGENCY MEDICINE

## 2024-05-06 PROCEDURE — 82962 GLUCOSE BLOOD TEST: CPT

## 2024-05-06 RX ORDER — ONDANSETRON 4 MG/1
4 TABLET, ORALLY DISINTEGRATING ORAL ONCE
Status: COMPLETED | OUTPATIENT
Start: 2024-05-06 | End: 2024-05-06

## 2024-05-06 RX ORDER — LISINOPRIL 20 MG/1
20 TABLET ORAL
Status: COMPLETED | OUTPATIENT
Start: 2024-05-06 | End: 2024-05-06

## 2024-05-06 RX ORDER — FLUOXETINE HYDROCHLORIDE 20 MG/1
40 CAPSULE ORAL DAILY
Status: DISCONTINUED | OUTPATIENT
Start: 2024-05-07 | End: 2024-05-08 | Stop reason: HOSPADM

## 2024-05-06 RX ORDER — HYDROXYZINE HYDROCHLORIDE 25 MG/1
25 TABLET, FILM COATED ORAL 3 TIMES DAILY PRN
Status: DISCONTINUED | OUTPATIENT
Start: 2024-05-06 | End: 2024-05-08 | Stop reason: HOSPADM

## 2024-05-06 RX ADMIN — ONDANSETRON 4 MG: 4 TABLET, ORALLY DISINTEGRATING ORAL at 23:30

## 2024-05-06 RX ADMIN — LISINOPRIL 20 MG: 20 TABLET ORAL at 23:31

## 2024-05-06 ASSESSMENT — PAIN SCALES - GENERAL: PAINLEVEL_OUTOF10: 0

## 2024-05-06 NOTE — ED NOTES
verbal shift change report given to DONNA Zuniga (oncoming nurse) by Donna Molina (offgoing nurse). Report included the following information Nurse Handoff Report, Index, ED Encounter Summary, ED SBAR, Adult Overview, Surgery Report, Intake/Output, MAR, Recent Results, Med Rec Status, Alarm Parameters, Pre Procedure Checklist, Procedure Verification, Quality Measures, Neuro Assessment, and Event Log.

## 2024-05-06 NOTE — ED NOTES
Pt assisted to the bathroom. Pt is back to the room, warm blankets given, no questions or concerns at this moment

## 2024-05-06 NOTE — ED PROVIDER NOTES
Patient was turned over to me by Dr. Arellano at change of shift.  Patient has been cleared medically and is awaiting placement by psychiatry.  Patient has required only a dose of Zofran for nausea during the course of the ED stay.  He is required no other intervention.  He has otherwise been stable in the ED.     Vasquez Alvarenga MD  05/06/24 3479

## 2024-05-06 NOTE — ED PROVIDER NOTES
ED SIGN OUT NOTE  Care assumed at [facility] 10:18 PM EDT    Patient was signed out to me by Dr. Live.     Patient signed out pending disposition      BP (!) 159/77   Pulse 69   Temp 97.7 °F (36.5 °C) (Tympanic)   Resp 18   SpO2 99%   Labs Reviewed   CBC WITH AUTO DIFFERENTIAL - Abnormal; Notable for the following components:       Result Value    Neutrophils % 76 (*)     Immature Granulocytes % 1 (*)     Immature Granulocytes Absolute 0.1 (*)     All other components within normal limits   COMPREHENSIVE METABOLIC PANEL - Abnormal; Notable for the following components:    Sodium 134 (*)     Glucose 214 (*)     Est, Glom Filt Rate 58 (*)     Alk Phosphatase 141 (*)     Albumin 3.4 (*)     Albumin/Globulin Ratio 0.9 (*)     All other components within normal limits   SALICYLATE LEVEL - Abnormal; Notable for the following components:    Salicylate, Serum <1.7 (*)     All other components within normal limits   ACETAMINOPHEN LEVEL - Abnormal; Notable for the following components:    Acetaminophen Level <2 (*)     All other components within normal limits   URINE CULTURE HOLD SAMPLE   ETHANOL   URINE DRUG SCREEN   EXTRA TUBES HOLD     No orders to display     ED Course as of 05/05/24 2218   Raymond May 05, 2024   1604 EKG documented and interpreted by Lou Conteh MD as: Normal sinus rhythm, HR approximately 65, regular rate, regular intervals, normal axis, no ST segment elevation   [LT]      ED Course User Index  [LT] Lou Conteh MD     Diagnosis:   1. Depression, unspecified depression type      Disposition:   Behavioral Health Hold 05/05/2024 07:26:21 PM        Plan:     Patient presented for mental health problems, feeling passively suicidal without plan per report. Medically cleared by prior team, [facility]ART evaluated and recommended inpatient placement. Awaiting bed placement at time of sign out.     No acute events overnight.     Patient signed out to the oncoming physician

## 2024-05-06 NOTE — ED NOTES
Bedside shift change report given to Jacki RN (oncoming nurse) by Deidre RN (offgoing nurse). Report included the following information Nurse Handoff Report, ED Encounter Summary, ED SBAR, MAR, and Recent Results.

## 2024-05-06 NOTE — ED NOTES
Patient changed into green gown. Patient was found to have Dexcom attached to abd. RN spoke to [facility] and was told patient cannot go to U with Dexcom. RN notified MD Conteh and was told patient can keep Dexcom on until transfer to U.

## 2024-05-07 PROCEDURE — 6370000000 HC RX 637 (ALT 250 FOR IP): Performed by: NURSE PRACTITIONER

## 2024-05-07 RX ORDER — FLUOXETINE HYDROCHLORIDE 20 MG/1
40 CAPSULE ORAL
Status: DISCONTINUED | OUTPATIENT
Start: 2024-05-07 | End: 2024-05-07

## 2024-05-07 RX ORDER — LOPERAMIDE HYDROCHLORIDE 2 MG/1
4 CAPSULE ORAL 4 TIMES DAILY PRN
Status: DISCONTINUED | OUTPATIENT
Start: 2024-05-07 | End: 2024-05-08 | Stop reason: HOSPADM

## 2024-05-07 RX ORDER — AMLODIPINE BESYLATE 5 MG/1
5 TABLET ORAL DAILY
COMMUNITY

## 2024-05-07 RX ORDER — ATORVASTATIN CALCIUM 10 MG/1
10 TABLET, FILM COATED ORAL
Status: COMPLETED | OUTPATIENT
Start: 2024-05-07 | End: 2024-05-07

## 2024-05-07 RX ORDER — AMLODIPINE BESYLATE 5 MG/1
5 TABLET ORAL
Status: COMPLETED | OUTPATIENT
Start: 2024-05-07 | End: 2024-05-07

## 2024-05-07 RX ORDER — CARVEDILOL 25 MG/1
25 TABLET ORAL
COMMUNITY

## 2024-05-07 RX ORDER — CARVEDILOL 12.5 MG/1
25 TABLET ORAL
Status: COMPLETED | OUTPATIENT
Start: 2024-05-07 | End: 2024-05-07

## 2024-05-07 RX ORDER — GABAPENTIN 100 MG/1
100 CAPSULE ORAL
Status: DISCONTINUED | OUTPATIENT
Start: 2024-05-07 | End: 2024-05-07

## 2024-05-07 RX ORDER — ALOGLIPTIN 12.5 MG/1
12.5 TABLET, FILM COATED ORAL
Status: COMPLETED | OUTPATIENT
Start: 2024-05-07 | End: 2024-05-07

## 2024-05-07 RX ORDER — LEVOTHYROXINE SODIUM 0.05 MG/1
100 TABLET ORAL
Status: COMPLETED | OUTPATIENT
Start: 2024-05-07 | End: 2024-05-07

## 2024-05-07 RX ORDER — LEVETIRACETAM 500 MG/1
250 TABLET ORAL
Status: COMPLETED | OUTPATIENT
Start: 2024-05-07 | End: 2024-05-07

## 2024-05-07 RX ORDER — GABAPENTIN 100 MG/1
100 CAPSULE ORAL 2 TIMES DAILY
COMMUNITY

## 2024-05-07 RX ORDER — FUROSEMIDE 20 MG/1
20 TABLET ORAL
Status: COMPLETED | OUTPATIENT
Start: 2024-05-07 | End: 2024-05-07

## 2024-05-07 RX ORDER — LEVETIRACETAM 250 MG/1
250 TABLET ORAL 2 TIMES DAILY
COMMUNITY

## 2024-05-07 RX ADMIN — LEVOTHYROXINE SODIUM 100 MCG: 50 TABLET ORAL at 18:47

## 2024-05-07 RX ADMIN — ATORVASTATIN CALCIUM 10 MG: 10 TABLET, FILM COATED ORAL at 18:50

## 2024-05-07 RX ADMIN — HYDROXYZINE HYDROCHLORIDE 25 MG: 25 TABLET, FILM COATED ORAL at 14:51

## 2024-05-07 RX ADMIN — ALOGLIPTIN 12.5 MG: 12.5 TABLET, FILM COATED ORAL at 18:46

## 2024-05-07 RX ADMIN — LOPERAMIDE HYDROCHLORIDE 4 MG: 2 CAPSULE ORAL at 13:47

## 2024-05-07 RX ADMIN — AMLODIPINE BESYLATE 5 MG: 5 TABLET ORAL at 18:47

## 2024-05-07 RX ADMIN — FLUOXETINE HYDROCHLORIDE 40 MG: 20 CAPSULE ORAL at 09:03

## 2024-05-07 RX ADMIN — CARVEDILOL 25 MG: 12.5 TABLET, FILM COATED ORAL at 18:47

## 2024-05-07 RX ADMIN — LEVETIRACETAM 250 MG: 500 TABLET, FILM COATED ORAL at 15:29

## 2024-05-07 RX ADMIN — FUROSEMIDE 20 MG: 20 TABLET ORAL at 15:31

## 2024-05-07 ASSESSMENT — PAIN - FUNCTIONAL ASSESSMENT: PAIN_FUNCTIONAL_ASSESSMENT: 0-10

## 2024-05-07 ASSESSMENT — LIFESTYLE VARIABLES
HOW OFTEN DO YOU HAVE A DRINK CONTAINING ALCOHOL: NEVER
HOW MANY STANDARD DRINKS CONTAINING ALCOHOL DO YOU HAVE ON A TYPICAL DAY: PATIENT DOES NOT DRINK
HOW MANY STANDARD DRINKS CONTAINING ALCOHOL DO YOU HAVE ON A TYPICAL DAY: PATIENT DOES NOT DRINK
HOW OFTEN DO YOU HAVE A DRINK CONTAINING ALCOHOL: NEVER

## 2024-05-07 ASSESSMENT — PAIN SCALES - GENERAL
PAINLEVEL_OUTOF10: 0

## 2024-05-07 NOTE — ED PROVIDER NOTES
12:16 PM   Harman Streeter is a 84 y.o. male awaiting placement in a psychiatric facility with a diagnosis of   1. Depression, unspecified depression type    . The patient was reexamined and remains clinically stable. All needs are being met at this time. All questions from the patient and/ or family were answered. The patient will continue to be reassessed intermittently until transfer.     Patient Vitals for the past 24 hrs:   BP Temp Temp src Pulse Resp SpO2   05/07/24 0815 (!) 166/85 97.5 °F (36.4 °C) Oral 74 17 98 %   05/07/24 0355 119/60 98.2 °F (36.8 °C) Oral 76 -- 99 %   05/06/24 2330 (!) 178/84 98.4 °F (36.9 °C) Oral 73 -- 97 %   05/06/24 2329 -- -- -- -- -- 99 %   05/06/24 1945 (!) 164/79 98.4 °F (36.9 °C) Oral 76 -- 97 %       Farooq Parks DO      3:38 PM  Change of shift. Care of patient turned over to Dr. Krueger; H&P reviewed, handoff complete.  Awaiting placement       Fraooq Parks,   05/07/24 9022

## 2024-05-07 NOTE — CONSULTS
[facility]  PSYCHIATRY CONSULT NOTE:    Name: Harman Streeter  MR#: [id number]  : 1940  ACCOUNT#: [id number]  ADMIT DATE: 2024    REASON FOR CONSULT: SA OD prozac (took 2)      HISTORY OF PRESENTING COMPLAINT:  Harman Streeter is a 84 y.o. male with PMH of  a-fib, DM, HTN, HLD, sleep apnea, and a multitude of additional diagnoses that he is managed well with his family practitioner.  His wife  several years ago and he has been despondent and progressively getting worse with depression per his daughter and friend.  He does endorse he has been feeling this way more recently and not feeling much enjoyment out of life any longer but has never had thoughts of hurting himself or planning.  He was recently started on Prozac 40 mg over the last week or 2. He has not noticed a significant difference yet. He was unable to tell me then why he took 2 of his 40 mg Prozac pills on 24 and then pushed his life alert because he got scared. He continues to deny it was a suicide attempt but just when the medicine to work more quickly. He initially was also willing to go inpatient if this was needed for some concentrated support.  After talking further about it he very much would appreciate more of a group outpatient environment and with focus on some grief therapy which she has never participated with.  He has been given a multitude of resources but not never followed up with this. He is currently seen in the ED on the medical floor at [facility]. Upon assessment, he lying on the stretcher in his room in the emergency department.  He initially was slightly defensive but quickly let his guard down and was pleasant and agreeable and conversant with me.  He expressed his daughter has been quite concerned about him and \"she is going to be so mad at me\" regarding him taking 2 of his Prozac's.  Recently she thinks she may be in [place] on vacation.  He does live alone.  He also has a prior arm weapon

## 2024-05-07 NOTE — CONSULTS
[facility]  PSYCHIATRY CONSULT NOTE:    Name: Harman Streeter  MR#: [id number]  : 1940  ACCOUNT#: [id number]  ADMIT DATE: 2024    REASON FOR CONSULT: SA OD prozac (took 2)    INTERVAL UPDATE:    Mr. Streeter seen today in the emergency department where he continues to be due to needing a room one-to-one from using a CPAP. He reports that he has been feeling much better now and believes he can go home and do partial program outpatient and he is motivated to do this.  He would like to talk to his daughter and have her help arrange as well.  He does respond more enthusiastically today and endorses he will be vigilant about calling or coming back to the ER if he has any thoughts at all of suicide or any other new concerning psychiatric symptoms.      HISTORY OF PRESENTING COMPLAINT:  Harman Streeter is a 84 y.o. male with PMH of  a-fib, DM, HTN, HLD, sleep apnea, and a multitude of additional diagnoses that he is managed well with his family practitioner.  His wife  several years ago and he has been despondent and progressively getting worse with depression per his daughter and friend.  He does endorse he has been feeling this way more recently and not feeling much enjoyment out of life any longer but has never had thoughts of hurting himself or planning.  He was recently started on Prozac 40 mg over the last week or 2. He has not noticed a significant difference yet. He was unable to tell me then why he took 2 of his 40 mg Prozac pills on 24 and then pushed his life alert because he got scared. He continues to deny it was a suicide attempt but just when the medicine to work more quickly. He initially was also willing to go inpatient if this was needed for some concentrated support.  After talking further about it he very much would appreciate more of a group outpatient environment and with focus on some grief therapy which she has never participated with.  He has been given a multitude of

## 2024-05-07 NOTE — ED NOTES
Patient has been calm and cooperative since assuming care. Patient has not had the best appetite and ate aprx 10% of his dinner tray.

## 2024-05-07 NOTE — ED NOTES
Bedside shift change report given to Desirae RN (oncoming nurse) by Anthony RN (offgoing nurse). Report included the following information ED Encounter Summary, ED SBAR, MAR, and Recent Results.

## 2024-05-08 VITALS
RESPIRATION RATE: 16 BRPM | WEIGHT: 167.99 LBS | DIASTOLIC BLOOD PRESSURE: 82 MMHG | BODY MASS INDEX: 27.99 KG/M2 | HEART RATE: 70 BPM | SYSTOLIC BLOOD PRESSURE: 159 MMHG | HEIGHT: 65 IN | OXYGEN SATURATION: 97 % | TEMPERATURE: 98.3 F

## 2024-05-08 PROCEDURE — 6370000000 HC RX 637 (ALT 250 FOR IP): Performed by: NURSE PRACTITIONER

## 2024-05-08 RX ADMIN — FLUOXETINE HYDROCHLORIDE 40 MG: 20 CAPSULE ORAL at 08:42

## 2024-05-08 NOTE — BSMART NOTE
Attempted to reach pt's daughter again and left message.   
BSMART Liaison Team Note     LOS: 2 days     Patient goal(s) for today: Patient goal(s) for today: take medications as prescribed, make needs known in an appropriate manner, engage in supportive counseling as needed  BSMART Liaison team focus/goals: assess needs, provide support and education, coordinate care, provide supportive counseling as needed     Progress note: Liaison met f/f with pt in his ER room at [facility]. Pt appears resting in bed, watching tv. He presents as alert, calm, cooperative, easily engaged, slightly irritable d/t prolonged ER stay. Pt reminds liaison that he has not yet received his medication for diarrhea. Liaison did advise medical staff of his request. Pt reports that his wife of 47 years  in . Pt says that he lives alone in a house in [place]. He says that he told his  that he was depressed. Pt evades question of SI, saying only that he wished he had  before his wife. He doesn't indicate SI otherwise. Pt denies HI. He says that he hears something like \"pets\" outside at times, but otherwise doesn't indicate any other AH/VH. Pt says that he doesn't sleep and that his appetite is down.     Liaison and pt discussed tx options. Pt says that he would be amenable to the [facility] PHP program. Discussed how it is close to his home, and he could drive there for participation. Also discussed and provided pt with the information for the [facility] \"Same Day Access\" Program. Provided pt with the information for grief support services through \"[facility] [place].\" Liaison consulted with JUNIOR Thomas regarding pt's disposition. Advised BSMART Counselor, Mary, and , of plan to discharge pt. Advised CM to f/u regarding getting daughter's involvement with discharge and outpatient tx through [facility] and PHP at [facility]. Mary agrees to f/u with regard to safety planning. Liaison to continue to monitor and to support.     Barriers to Discharge: 
BSMART assessment completed, and suicide risk level noted to be LOW. Primary Nurse NA and Charge Nurse NA and Physician Dr. Conteh notified. Concerns not observed.         
Bsmart on way to bedside.   
Bsmart progress note:    Pt observed asleep during time of round without sitter at bedside. No behavioral concern during this time . Plan is to discharge Pt home once contact is made with someone who can secure Pt's firearm at home . Multiple calls have been made to preferred support although no call back has been received up until this time. [facility]'s daughter who is responsive but currently in [place]  and unable to safety plan.  
Next Bsmart shift made aware pt is inpatient psychiatric admission waiting on [facility] admission only-psych consult needed for tomorrow. Bed access aware pt is a hold.   
Next Bsmart shift updated pt continues to wait for a bed at [facility] only. Pt uses a cpap. Should pt decide to leave his daughter documented in note should be contacted to secure gun within pt's home. Writer has attempted last two days to reach daughter w messages left and no return calls. Pt did not report suicide but he is a retired  making him a higher risk with weapon in home. Place psychiatric consult.   
Per psychiatric nurse practitioner, Jud Thomas, patient has been cleared from a psychiatric standpoint and can be discharged.     BSMART completed safety plan and documented it.     Pt provided verbal consent to speak with daughter/Pamela Abdi [phone]. Spoke with daughter via telephone. She is agreeable to discharge and is comfortable with patient returning home. Daughter reported she lives [place] and in [place] and travels between the two \[facility] [place]\"" weekly. Daughter reported pt declined due to coming off the Prozac but at baseline he is independent, goes to [employer] and drives. When asked about the gun at the home, Daughter stated, \"He's not a threat to himself. He told his friend Franko it doesn't even work.\" She cannot pick patient up today as she comes back to [place] on Thursday, but shared he has a friend from [facility]/Franko might be able to assist with this. Daughter believes patient mistreats EMS and has offered him several opportunities to see his family and go to [place] but he declines. [facility] She will call tomorrow and set up an appointment with partial hospitalization program. Per daughter, CM has been in contact with her regarding outpatient services.  Daughter stated she will come to [place] this Thursday. At this time, daughter feels safe with patient coming home and denies any mental health concerns.     Pt provided verbal consent for this writer to speak with Franko/friend at [phone]. Called Franko and left voicemail. Awaiting call back.     6:21PM: Left another voicemail.     Patient updated. RN updated.     Mary Moss LCSW  
Recommendation by PMHNP, Jud Thomas is for discharge home. Per chart review, MART clinician Mary completed safety plan yesterday and was pending a call back from pt's friend, Franko regarding securing broken firearm at home.     Was requested by [facility] , Margarita Lobato to contact [facility] non-emergency to ask if they are able to meet pt at home upon discharge and remove the broken firearm until pt's daughter is back in town and able to collect. Spoke w/ [facility] Officer Kaycee who confirms they would be able to meet w/ pt today and remove the firearm as long as he consents to this.  Consulted w/ the current ED Medical Provider, Dr. Ralph Live who is in agreement w/ plan for discharge.     Spoke w/ pt's daughter, Pamela via TC ([phone]) who continues to be in agreement that pt is safe to return home and that she will collect pt's firearm from police upon returning to [place].  Pamela confirms that she has scheduled an initial appointment for [facility] for this coming Monday, 5/13 at 1130.  Clinician met w/ pt face to face to discuss plan. Pt is in agreement w/ plan for police to meet him at his home upon discharge and remove firearm.      1100: Was informed by Charge RNClau that pt was just picked up by transportation. ETA for home is 10 minutes.  Spoke w/ [facility] Officer Isai to notify and send dispatch to home.    1155: Received call from [facility] Officer Brandt who confirms pt's firearm has been removed from the home and he will be reaching out to pt's daughter, Pamela to follow-up regarding family collecting the weapon.              
This writer rounded on patient.  Patient agreed to talk with this writer and was informed of counselor's role.    The patient's appearance shows poor hygiene.  The patient's behavior shows poor eye contact. The patient is oriented to time, place, person and situation.  The patient's speech is soft.  The patient's mood  is depressed and is withdrawn.  The range of affect is flat.  The patient's thought content  demonstrates no evidence of impairment.  The thought process shows no evidence of impairment.  The patient's perception demonstrated changes in the following:  auditory when at home. The patient's memory shows no evidence of impairment.  The patient's appetite is decreased and reports he \"can't keep anything down\".  The patient's sleep has evidence of insomnia while in the ED. The patient shows little insight.  The patient's judgement is psychologically impaired.  Patient endorses suicidal and/or homicidal ideation and reports \"I'm a coward\" when asked of plan.   The plan is hold for a bed placement at [facility].  
This writer rounded on patient.  Patient agreed to talk with this writer and was informed of counselor's role.  The patient's appearance shows no evidence of impairment.  The patient's behavior shows no evidence of impairment. The patient is oriented to time, place, person and situation.  The patient's speech shows no evidence of impairment.  The patient's mood  is euthymic.  The range of affect shows no evidence of impairment.  The patient's thought content  demonstrates no evidence of impairment.  The thought process shows no evidence of impairment.  The patient's perception shows no evidence of impairment. The patient's memory shows no evidence of impairment.  The patient's appetite shows no evidence of impairment.  The patient's sleep shows no evidence of impairment. The patient's insight shows no evidence of impairment.  The patient's judgement shows no evidence of impairment.  Patient denied suicidal and/or homicidal ideation.   The plan is to await a bed access aware. Pt only open to [facility]  
160mg. Pt disheveled and poor hygiene. Pt had poor eye contact and flat affect with intermittent sobbing. Normal rhythm, speed and tone of speech. Pt endorsed being severely depressed for a while but is unsure exactly how long. Pt shared he misses his wife who  10 years ago. He denied extra dose of Prozac to be intentional overdose. Pt shared he has been taking his Prozac prescribed by PCP off and on and today he thought taking an extra one may help him. Pt shared he is unsure how long he has been severely depressed but feels it has been at least a few months. 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 Pt has no previous suicide attempts or inpatient psychiatric admissions to include outpatient services. Pt denied HI and AVH. However, he stated when he opens his window he does hear noises. No hx of psychosis. Pt shared he used to eat 3 meals a day and now he is down to one. Pt reported to be sleeping all day 12+ hours daily. Pt denied SA. Pt lives alone but is supported by his daughters. Pt gave writer verbal permission to call his daughter, Pamela White [phone] and a voicemail was left for her in order to obtain collateral.     Pt recommended for inpatient psychiatric admission for mood stabilization (severe depression/anhedonia). Dr. Conteh in support of disposition. Bed access made aware.             The patient has demonstrated mental capacity to provide informed consent.  The information is given by the patient.  The Chief Complaint is depression, anhedonia.  The Precipitant Factors are  wife.  Previous Hospitalizations: denied  The patient has not previously been in restraints.  Current Psychiatrist and/or  is no one.    Lethality Assessment:    The potential for suicide noted by the following: not noted.  The potential for homicide is not noted.  The patient has not been a perpetrator of sexual or physical abuse.  There are 
discussed outpatient counseling and grief support groups.  Liaison will continue to monitor and support, as needed.      Barriers to Discharge: BHU bed availability    Outpatient provider(s):  none reported  Insurance info/prescription coverage:  medicare    Diagnosis: hx of depression, anxiety    Plan:  BHU bed availability.  Please refer to most recent psychiatric consult note and medical team for recommendations and disposition.     Follow up Psych Consult placed? No .   Psychiatrist updated? No      Participating treatment team members: Bárbara Mendoza LCSW Susan Dabney, LCSW (Beth)  BSMART Liaison  Available on [employer]

## 2024-05-08 NOTE — ED NOTES
ED SIGN OUT NOTE  Care assumed at [facility] 11:14 PM EDT    Patient was signed out to me by Dr. Krueger.     Patient is awaiting behavioral health placement.    BP (!) 146/76   Pulse 67   Temp 98.8 °F (37.1 °C) (Tympanic)   Resp 18   Ht 1.651 m (5' 5\")   Wt 76.2 kg (167 lb 15.9 oz)   SpO2 97%   BMI 27.96 kg/m²     Labs Reviewed   CBC WITH AUTO DIFFERENTIAL - Abnormal; Notable for the following components:       Result Value    Neutrophils % 76 (*)     Immature Granulocytes % 1 (*)     Immature Granulocytes Absolute 0.1 (*)     All other components within normal limits   COMPREHENSIVE METABOLIC PANEL - Abnormal; Notable for the following components:    Sodium 134 (*)     Glucose 214 (*)     Est, Glom Filt Rate 58 (*)     Alk Phosphatase 141 (*)     Albumin 3.4 (*)     Albumin/Globulin Ratio 0.9 (*)     All other components within normal limits   SALICYLATE LEVEL - Abnormal; Notable for the following components:    Salicylate, Serum <1.7 (*)     All other components within normal limits   ACETAMINOPHEN LEVEL - Abnormal; Notable for the following components:    Acetaminophen Level <2 (*)     All other components within normal limits   POCT GLUCOSE - Abnormal; Notable for the following components:    POC Glucose 195 (*)     All other components within normal limits   URINE CULTURE HOLD SAMPLE   ETHANOL   URINE DRUG SCREEN   EXTRA TUBES HOLD     No orders to display       ED Course as of 05/08/24 0619   [place] May 05, 2024   1604 EKG documented and interpreted by Lou Conteh MD as: Normal sinus rhythm, HR approximately 65, regular rate, regular intervals, normal axis, no ST segment elevation   [LT]      ED Course User Index  [LT] Lou Conteh MD       Diagnosis:   1. Depression, unspecified depression type        Disposition:   Behavioral Health Hold 05/05/2024 07:26:21 PM    Plan:   Patient resting comfortably.  Patient has no complaints.  Patient awaiting behavioral health

## 2024-05-08 NOTE — CARE COORDINATION
11:04 AM  CM consult received for patient transport.  Address verified.  Informed patient ambulatory.  Referral placed to Round Trip.  ETA requested for 11:05.  Patient to be picked up at ED Entrance.   to contact patient registration in route and upon arrival.    GENET Bowen/ARMIN

## 2024-05-08 NOTE — CONSULTS
INTERVAL DISCHARGE UPDATE NOTE:    B-SMART and case management team unable to secure a ride home for Harman at this time due to his daughter being in [place] on vacation. He has a gun at home that is not assembled but we would like this removed by whomever takes Mr. Chakraborty home.     I am ok with someone else taking him home as long as there is a secure plan and safety plan discussed /gun in locked away storage far from most used rooms.     Thank you,    Jud Thomas PMRENETTAP

## 2024-05-08 NOTE — ED NOTES
Pt signed out to me at 3pm by Dr Parks pending psychiatric bed placement    10:28 PM pt in no acute distress. No acute needs. Awaiting further guidance from bsmart.    Signed out to night team at 11pm    DO Pati Brewster Courtland E, DO  05/07/24 7002

## 2024-05-08 NOTE — ED NOTES
Discharge paperwork reviewed with pt & questions answered. CM setting up pt roundtrip ride. Pt taken to the [facility].

## 2024-05-09 ENCOUNTER — HOSPITAL ENCOUNTER (EMERGENCY)
Facility: HOSPITAL | Age: 84
Discharge: HOME | End: 2024-05-10
Attending: STUDENT IN AN ORGANIZED HEALTH CARE EDUCATION/TRAINING PROGRAM
Payer: COMMERCIAL

## 2024-05-09 DIAGNOSIS — R45.851 SUICIDAL THOUGHTS: ICD-10-CM

## 2024-05-09 DIAGNOSIS — F32.9 MAJOR DEPRESSION, CHRONIC: Primary | ICD-10-CM

## 2024-05-09 PROCEDURE — 99283 EMERGENCY DEPT VISIT LOW MDM: CPT

## 2024-05-09 ASSESSMENT — PAIN - FUNCTIONAL ASSESSMENT: PAIN_FUNCTIONAL_ASSESSMENT: 0-10

## 2024-05-09 ASSESSMENT — PAIN SCALES - GENERAL: PAINLEVEL_OUTOF10: 0

## 2024-05-10 VITALS
HEART RATE: 55 BPM | RESPIRATION RATE: 16 BRPM | DIASTOLIC BLOOD PRESSURE: 75 MMHG | SYSTOLIC BLOOD PRESSURE: 146 MMHG | TEMPERATURE: 98.4 F | OXYGEN SATURATION: 97 %

## 2024-05-10 ASSESSMENT — PAIN - FUNCTIONAL ASSESSMENT: PAIN_FUNCTIONAL_ASSESSMENT: NONE - DENIES PAIN

## 2024-05-10 ASSESSMENT — PAIN SCALES - GENERAL: PAINLEVEL_OUTOF10: 0

## 2024-05-10 NOTE — BSMART NOTE
BSMART assessment completed, and suicide risk level noted to be low risk . Charge Nurse Danitza  and Physician Dr. Pang notified. Concerns observed due to patient expressing passive suicidal ideation and will require a sitter for safety while in the ER

## 2024-05-10 NOTE — ED PROVIDER NOTES
[facility] EMERGENCY DEP  EMERGENCY DEPARTMENT ENCOUNTER      Pt Name: Harman Streeter  MRN: [id number]  Birthdate 1940  Date of evaluation: 5/9/2024  Provider: Maykel Bonds MD    CHIEF COMPLAINT       Chief Complaint   Patient presents with    Depression         HISTORY OF PRESENT ILLNESS   (Location/Symptom, Timing/Onset, Context/Setting, Quality, Duration, Modifying Factors, Severity)  Note limiting factors.   Patient is an 84-year-old male presenting to the emergency department for major depressive episode has vague thoughts of suicide.  Patient was recently seen and plan for admission by psychiatry patient was discharged with goal of having outpatient therapy returns today saying \"I no longer want to live I just want to be cremated and my ashes spread with my wife\".  Patient states that he lives at home by himself he has no family support or social support he denies any alcohol or recreational drug use patient repeats himself by saying he no longer wants to live.            Review of External Medical Records:     Nursing Notes were reviewed.    REVIEW OF SYSTEMS    (2-9 systems for level 4, 10 or more for level 5)     Review of Systems    Except as noted above the remainder of the review of systems was reviewed and negative.       PAST MEDICAL HISTORY     Past Medical History:   Diagnosis Date    Acquired hypothyroidism 7/31/2017    Arrhythmia     Arthritis     Atrial fibrillation (HCC)     Colon polyp     Depression     DM (diabetes mellitus) (HCC) 9/2/2011    DIET-CONTROLLED    GERD (gastroesophageal reflux disease) 6/21/2011    AFTER SPICY FOODS     Heart failure (HCC)     HTN (hypertension) 6/21/2011    Hyperlipidemia 6/21/2011    Ill-defined condition     33 lb intentional wt loss over 18-24 months    Moderate vision loss of one eye     LEFT EYE VISION LOSS    GAIL on CPAP     Unspecified sleep apnea     Uses CPAP    Vitamin D deficiency 1/28/2014         SURGICAL HISTORY       Past Surgical

## 2024-05-10 NOTE — ED NOTES
ED SIGN OUT NOTE  Care assumed at [facility] 11:00 PM EDT    Patient was signed out to me by Dr. Bodns.     Patient is awaiting BSMART eval and disposition.    BP (!) 146/75   Pulse 55   Temp 98.4 °F (36.9 °C)   Resp 16   SpO2 97%     Labs Reviewed - No data to display  No orders to display       ED Course as of 05/10/24 0730   Thu May 09, 2024   2350 D/w BSMART, pt has need for additional outpatient resources at the very least. Will d/w on-call psychiatrist. If nothing else, will require CM. [RS]   Fri May 10, 2024   0205 Pt accepted for inpatient admission, but will require geripsych bed due to age and chronic CPAP use. Will hold in ED for open bed. [RS]   0648 Pt awaiting bed, he is frustrated. He wishes to return home. He has had weapon removed from the house. He already had a plan for PHP at [facility]. Can continue with this plan. BSMART to help make safety plan [RS]      ED Course User Index  [RS] Dao Pang MD       Diagnosis:   1. Major depression, chronic    2. Suicidal thoughts        Disposition:   Discharge - Pending Orders Complete 05/10/2024 07:01:48 AM    Plan:   DC home with outpatient f/u and safety plan    MD Poly Mohan Ryland, MD  05/10/24 5102

## 2024-05-10 NOTE — BSMART NOTE
Patient is requesting discharge and safety planning due to potential extended hold in ER. Patient reports that he will follow the safety plan and referral for the PHP program organized during his previous hold in hospital. Attending medical providers were informed regarding request and updated safety planning.

## 2024-05-10 NOTE — BSMART NOTE
he did state that his passive suicidal ideation has increased since being discharged. Harman reports that he had no supports at home to help him connect with services which caused him to experience feelings of hopelessness and helplessness. Harman stated that while at home due to his depressive symptoms,  he struggled to sleep and had to force himself to eat. Harman did not report any substance use or concerns with hallucinations, delusional thought or homicidal ideation.         Harman is advocating for admission in order to treat his depression and suicidal thoughts to ensure his overall safety. This writer did contact On-Call psychiatry in order to process case and explore potential treatment options. This writer was advised that voluntary admission can be explored for the Harman in order to ensure his safety.   The patient has demonstrated mental capacity to provide informed consent.  The information is given by the patient and past medical records.  The Chief Complaint is Suicidal ideation .  The Precipitant Factors are lack of social supports, no formal mental health supports .  Previous Hospitalizations: None reported   The patient has not previously been in restraints.  Current Psychiatrist and/or  is: Not Connected .    Lethality Assessment:    The potential for suicide noted by the following: noted by the following; patient reports passive  ideation.  The potential for homicide is not noted.  The patient has not been a perpetrator of sexual or physical abuse.  There are not pending charges.  The patient is  felt to be at risk for self harm or harm to others.  The attending nurse was advised the patient is at risk for self harm and the patient needs supervision.    Section III - Psychosocial  The patient's overall mood and attitude is presents as depressed and frustrated.  Feelings of helplessness and hopelessness were reported during the assessment.  Generalized anxiety was reported during the  assessment.  Panic is not observed. Phobias are not observed.  Obsessive compulsive tendencies are not observed.      Section IV - Mental Status Exam  The patient's appearance shows no evidence of impairment.  The patient's behavior is agitated. The patient is oriented to time, place, person and situation.  The patient's speech shows no evidence of impairment.  The patient's mood is depressed, is anxious, and is irritable.  The range of affect shows no evidence of impairment.  The patient's thought content demonstrates no evidence of impairment .  The thought process shows no evidence of impairment.  The patient's perception shows no evidence of impairment. The patient's memory shows no evidence of impairment.  The patient's appetite is decreased and shows signs of weight loss .  The patient's sleep shows no evidence of impairment. The patient's insight shows no evidence of impairment.  The patient's judgement shows no evidence of impairment.      Section V - Substance Abuse  The patient is not using substances.     Section VI - Living Arrangements  The patient .  The patient lives alone. The patient has 2 adult children.  The patient does plan to return home upon discharge.  The patient does not have legal issues pending. The patient's source of income comes from social security.  Congregation and cultural practices have not been voiced at this time.    The patient's greatest support comes from his friend Franko  and this person will not be involved with the treatment.    The patient has not been in an event described as horrible or outside the realm of ordinary life experience either currently or in the past.  The patient has not been a victim of sexual/physical abuse.    Section VII - Other Areas of Clinical Concern  The highest grade achieved is HS diploma with the overall quality of school experience being described as N/A.  The patient is currently retired and speaks English as a primary language.  The

## 2024-05-10 NOTE — DISCHARGE INSTRUCTIONS
You have been evaluated in the Emergency Department today for your psychiatric complaint. You were evaluated by both Emergency Medicine and Psychiatry staff and have been cleared to go home.    Please follow up with your mental health provider within 2-3 days. Please use the resources given to you in the Emergency Department.     Call or text [phone] (trained counselors are available 24/7) or return to the ER if you are having worsening symptoms, especially if you experience thoughts of hurting yourself or others, audio or visual hallucinations, or for any other concerning symptoms.

## 2024-05-10 NOTE — ED TRIAGE NOTES
Pt BIBA from home co depression. Pt was seen here this past week for same thing. Pt taking 40mg of Prozac. :Pt states \"I don't want to live any longer.\" Pt denies any plan or intention> Denies HI/AHVH.     PMH: depression

## 2024-06-08 ENCOUNTER — OFFICE VISIT (OUTPATIENT)
Age: 84
End: 2024-06-08

## 2024-06-08 VITALS
HEIGHT: 65 IN | BODY MASS INDEX: 27.99 KG/M2 | TEMPERATURE: 97.8 F | WEIGHT: 168 LBS | DIASTOLIC BLOOD PRESSURE: 77 MMHG | SYSTOLIC BLOOD PRESSURE: 170 MMHG | HEART RATE: 67 BPM | OXYGEN SATURATION: 98 %

## 2024-06-08 DIAGNOSIS — S51.812A SKIN TEAR OF LEFT FOREARM WITHOUT COMPLICATION, INITIAL ENCOUNTER: Primary | ICD-10-CM

## 2024-06-08 NOTE — PROGRESS NOTES
Subjective     Chief Complaint   Patient presents with    New Patient    Arm Injury     Fell off curb, landing on left side. Scraping the forearm to elbow..        Patient is 84 year old male presenting with skin tear to left forearm.  Patient reports he fell onto the curb approximately one hour prior to arrival.  He denies loss of consciousness or striking head.  Unknown last tetanus shot.           Past Medical History:   Diagnosis Date    Acquired hypothyroidism 7/31/2017    Arrhythmia     Arthritis     Atrial fibrillation (HCC)     Colon polyp     Depression     DM (diabetes mellitus) (HCC) 9/2/2011    DIET-CONTROLLED    GERD (gastroesophageal reflux disease) 6/21/2011    AFTER SPICY FOODS     Heart failure (HCC)     HTN (hypertension) 6/21/2011    Hyperlipidemia 6/21/2011    Ill-defined condition     33 lb intentional wt loss over 18-24 months    Moderate vision loss of one eye     LEFT EYE VISION LOSS    GAIL on CPAP     Unspecified sleep apnea     Uses CPAP    Vitamin D deficiency 1/28/2014       Past Surgical History:   Procedure Laterality Date    COLONOSCOPY  10/09    neg. rep 10 yrs.    COLONOSCOPY N/A 3/6/2019    COLONOSCOPY performed by Robbin Sy MD at [facility] ENDOSCOPY    HEENT      L EYE STRABISMUS CORRECTION    HERNIA REPAIR      UMBILICAL REPAIR WITH MESH    TONSILLECTOMY      VASCULAR SURGERY  /P 2007    CARDIAC CATH DONE AT [facility] - vessels clear per pt    VASECTOMY         Family History   Problem Relation Age of Onset    Heart Disease Brother     Heart Disease Maternal Grandmother     Heart Disease Maternal Grandfather     Other Sister         Myasthenia Gravis    Heart Disease Father     Diabetes Father     Heart Disease Mother        Allergies   Allergen Reactions    Bupropion Other (See Comments)    Darunavir Hives    Penciclovir      Other Reaction(s): Unknown    Penicillin G Benzathine & Proc Swelling    Ciprofloxacin Hives, Rash and Other (See Comments)     10 yrs ago, unsure if it was

## 2024-06-08 NOTE — PATIENT INSTRUCTIONS
Thank you for visiting [facility] today.       If you begin to have shortness of breath, chest pain or uncontrollable fever of 100.4 or greater, please go to the Emergency Room.

## 2025-06-22 ENCOUNTER — APPOINTMENT (OUTPATIENT)
Facility: HOSPITAL | Age: 85
DRG: 179 | End: 2025-06-22
Payer: MEDICARE

## 2025-06-22 ENCOUNTER — HOSPITAL ENCOUNTER (EMERGENCY)
Facility: HOSPITAL | Age: 85
Discharge: ANOTHER HOSPITAL | DRG: 179 | End: 2025-06-22
Attending: EMERGENCY MEDICINE | Admitting: INTERNAL MEDICINE
Payer: MEDICARE

## 2025-06-22 ENCOUNTER — HOSPITAL ENCOUNTER (INPATIENT)
Facility: HOSPITAL | Age: 85
LOS: 5 days | Discharge: NURSING, REHAB OR LONG-TERM CARE | End: 2025-06-27
Attending: INTERNAL MEDICINE | Admitting: FAMILY MEDICINE
Payer: MEDICARE

## 2025-06-22 VITALS
HEIGHT: 65 IN | SYSTOLIC BLOOD PRESSURE: 165 MMHG | WEIGHT: 160 LBS | DIASTOLIC BLOOD PRESSURE: 63 MMHG | RESPIRATION RATE: 13 BRPM | OXYGEN SATURATION: 94 % | TEMPERATURE: 97.9 F | HEART RATE: 70 BPM | BODY MASS INDEX: 26.66 KG/M2

## 2025-06-22 DIAGNOSIS — J86.9 EMPYEMA (HCC): Primary | ICD-10-CM

## 2025-06-22 DIAGNOSIS — Z51.5 PALLIATIVE CARE ENCOUNTER: Primary | ICD-10-CM

## 2025-06-22 PROBLEM — J90 PLEURAL EFFUSION: Status: ACTIVE | Noted: 2025-06-22

## 2025-06-22 PROBLEM — J85.2 ABSCESS OF LOWER LOBE OF RIGHT LUNG WITHOUT PNEUMONIA (HCC): Status: ACTIVE | Noted: 2025-06-22

## 2025-06-22 LAB
ALBUMIN SERPL-MCNC: 2.2 G/DL (ref 3.5–5)
ALBUMIN/GLOB SERPL: 0.4 (ref 1.1–2.2)
ALP SERPL-CCNC: 89 U/L (ref 45–117)
ALT SERPL-CCNC: 15 U/L (ref 12–78)
ANION GAP SERPL CALC-SCNC: 7 MMOL/L (ref 2–12)
AST SERPL-CCNC: 37 U/L (ref 15–37)
BASOPHILS # BLD: 0.11 K/UL (ref 0–0.1)
BASOPHILS NFR BLD: 0.9 % (ref 0–1)
BILIRUB SERPL-MCNC: 0.5 MG/DL (ref 0.2–1)
BUN SERPL-MCNC: 21 MG/DL (ref 6–20)
BUN/CREAT SERPL: 16 (ref 12–20)
CALCIUM SERPL-MCNC: 8.9 MG/DL (ref 8.5–10.1)
CHLORIDE SERPL-SCNC: 103 MMOL/L (ref 97–108)
CO2 SERPL-SCNC: 28 MMOL/L (ref 21–32)
CREAT SERPL-MCNC: 1.31 MG/DL (ref 0.7–1.3)
D DIMER PPP FEU-MCNC: 1.66 MG/L FEU (ref 0–0.65)
DIFFERENTIAL METHOD BLD: ABNORMAL
EOSINOPHIL # BLD: 0.19 K/UL (ref 0–0.4)
EOSINOPHIL NFR BLD: 1.5 % (ref 0–7)
ERYTHROCYTE [DISTWIDTH] IN BLOOD BY AUTOMATED COUNT: 13.3 % (ref 11.5–14.5)
EST. AVERAGE GLUCOSE BLD GHB EST-MCNC: 183 MG/DL
GLOBULIN SER CALC-MCNC: 4.9 G/DL (ref 2–4)
GLUCOSE BLD STRIP.AUTO-MCNC: 272 MG/DL (ref 65–117)
GLUCOSE SERPL-MCNC: 253 MG/DL (ref 65–100)
HBA1C MFR BLD: 8 % (ref 4–5.6)
HCT VFR BLD AUTO: 34.2 % (ref 36.6–50.3)
HGB BLD-MCNC: 11.8 G/DL (ref 12.1–17)
IMM GRANULOCYTES # BLD AUTO: 0.08 K/UL (ref 0–0.04)
IMM GRANULOCYTES NFR BLD AUTO: 0.6 % (ref 0–0.5)
LDH SERPL L TO P-CCNC: 156 U/L (ref 85–241)
LIPASE SERPL-CCNC: 43 U/L (ref 13–75)
LYMPHOCYTES # BLD: 1.35 K/UL (ref 0.8–3.5)
LYMPHOCYTES NFR BLD: 10.4 % (ref 12–49)
MAGNESIUM SERPL-MCNC: 1.8 MG/DL (ref 1.6–2.4)
MCH RBC QN AUTO: 31.6 PG (ref 26–34)
MCHC RBC AUTO-ENTMCNC: 34.5 G/DL (ref 30–36.5)
MCV RBC AUTO: 91.7 FL (ref 80–99)
MONOCYTES # BLD: 1.31 K/UL (ref 0–1)
MONOCYTES NFR BLD: 10.1 % (ref 5–13)
NEUTS SEG # BLD: 9.89 K/UL (ref 1.8–8)
NEUTS SEG NFR BLD: 76.5 % (ref 32–75)
NRBC # BLD: 0 K/UL (ref 0–0.01)
NRBC BLD-RTO: 0 PER 100 WBC
NT PRO BNP: 851 PG/ML
PLATELET # BLD AUTO: 405 K/UL (ref 150–400)
PMV BLD AUTO: 9.9 FL (ref 8.9–12.9)
POTASSIUM SERPL-SCNC: 3.8 MMOL/L (ref 3.5–5.1)
PROCALCITONIN SERPL-MCNC: <0.05 NG/ML
PROT SERPL-MCNC: 6.5 G/DL (ref 6.4–8.2)
PROT SERPL-MCNC: 7.1 G/DL (ref 6.4–8.2)
RBC # BLD AUTO: 3.73 M/UL (ref 4.1–5.7)
SERVICE CMNT-IMP: ABNORMAL
SODIUM SERPL-SCNC: 138 MMOL/L (ref 136–145)
TROPONIN I SERPL HS-MCNC: 32 NG/L (ref 0–76)
WBC # BLD AUTO: 12.9 K/UL (ref 4.1–11.1)

## 2025-06-22 PROCEDURE — 84484 ASSAY OF TROPONIN QUANT: CPT

## 2025-06-22 PROCEDURE — 87449 NOS EACH ORGANISM AG IA: CPT

## 2025-06-22 PROCEDURE — 85379 FIBRIN DEGRADATION QUANT: CPT

## 2025-06-22 PROCEDURE — 1100000000 HC RM PRIVATE

## 2025-06-22 PROCEDURE — 96366 THER/PROPH/DIAG IV INF ADDON: CPT

## 2025-06-22 PROCEDURE — 83615 LACTATE (LD) (LDH) ENZYME: CPT

## 2025-06-22 PROCEDURE — 83880 ASSAY OF NATRIURETIC PEPTIDE: CPT

## 2025-06-22 PROCEDURE — 6360000004 HC RX CONTRAST MEDICATION: Performed by: RADIOLOGY

## 2025-06-22 PROCEDURE — 71275 CT ANGIOGRAPHY CHEST: CPT

## 2025-06-22 PROCEDURE — 6360000002 HC RX W HCPCS: Performed by: INTERNAL MEDICINE

## 2025-06-22 PROCEDURE — 6370000000 HC RX 637 (ALT 250 FOR IP)

## 2025-06-22 PROCEDURE — 94761 N-INVAS EAR/PLS OXIMETRY MLT: CPT

## 2025-06-22 PROCEDURE — 83036 HEMOGLOBIN GLYCOSYLATED A1C: CPT

## 2025-06-22 PROCEDURE — 36415 COLL VENOUS BLD VENIPUNCTURE: CPT

## 2025-06-22 PROCEDURE — 6360000002 HC RX W HCPCS: Performed by: EMERGENCY MEDICINE

## 2025-06-22 PROCEDURE — 6370000000 HC RX 637 (ALT 250 FOR IP): Performed by: FAMILY MEDICINE

## 2025-06-22 PROCEDURE — 82962 GLUCOSE BLOOD TEST: CPT

## 2025-06-22 PROCEDURE — 99285 EMERGENCY DEPT VISIT HI MDM: CPT

## 2025-06-22 PROCEDURE — 05HY33Z INSERTION OF INFUSION DEVICE INTO UPPER VEIN, PERCUTANEOUS APPROACH: ICD-10-PCS | Performed by: FAMILY MEDICINE

## 2025-06-22 PROCEDURE — 2060000000 HC ICU INTERMEDIATE R&B

## 2025-06-22 PROCEDURE — 71045 X-RAY EXAM CHEST 1 VIEW: CPT

## 2025-06-22 PROCEDURE — 2580000003 HC RX 258: Performed by: FAMILY MEDICINE

## 2025-06-22 PROCEDURE — 93005 ELECTROCARDIOGRAM TRACING: CPT | Performed by: EMERGENCY MEDICINE

## 2025-06-22 PROCEDURE — 86738 MYCOPLASMA ANTIBODY: CPT

## 2025-06-22 PROCEDURE — 6360000002 HC RX W HCPCS: Performed by: FAMILY MEDICINE

## 2025-06-22 PROCEDURE — 84155 ASSAY OF PROTEIN SERUM: CPT

## 2025-06-22 PROCEDURE — 6370000000 HC RX 637 (ALT 250 FOR IP): Performed by: EMERGENCY MEDICINE

## 2025-06-22 PROCEDURE — 84145 PROCALCITONIN (PCT): CPT

## 2025-06-22 PROCEDURE — 96367 TX/PROPH/DG ADDL SEQ IV INF: CPT

## 2025-06-22 PROCEDURE — 2580000003 HC RX 258: Performed by: EMERGENCY MEDICINE

## 2025-06-22 PROCEDURE — 83735 ASSAY OF MAGNESIUM: CPT

## 2025-06-22 PROCEDURE — 96374 THER/PROPH/DIAG INJ IV PUSH: CPT

## 2025-06-22 PROCEDURE — 85025 COMPLETE CBC W/AUTO DIFF WBC: CPT

## 2025-06-22 PROCEDURE — 80053 COMPREHEN METABOLIC PANEL: CPT

## 2025-06-22 PROCEDURE — 96365 THER/PROPH/DIAG IV INF INIT: CPT

## 2025-06-22 PROCEDURE — 83690 ASSAY OF LIPASE: CPT

## 2025-06-22 RX ORDER — ONDANSETRON 4 MG/1
4 TABLET, ORALLY DISINTEGRATING ORAL EVERY 8 HOURS PRN
Status: DISCONTINUED | OUTPATIENT
Start: 2025-06-22 | End: 2025-06-22 | Stop reason: HOSPADM

## 2025-06-22 RX ORDER — METRONIDAZOLE 500 MG/100ML
500 INJECTION, SOLUTION INTRAVENOUS EVERY 8 HOURS
Status: DISCONTINUED | OUTPATIENT
Start: 2025-06-22 | End: 2025-06-22 | Stop reason: HOSPADM

## 2025-06-22 RX ORDER — PROMETHAZINE HYDROCHLORIDE AND CODEINE PHOSPHATE 6.25; 1 MG/5ML; MG/5ML
5 SYRUP ORAL EVERY 4 HOURS PRN
Status: DISCONTINUED | OUTPATIENT
Start: 2025-06-22 | End: 2025-06-22 | Stop reason: RX

## 2025-06-22 RX ORDER — ONDANSETRON 2 MG/ML
4 INJECTION INTRAMUSCULAR; INTRAVENOUS EVERY 6 HOURS PRN
Status: DISCONTINUED | OUTPATIENT
Start: 2025-06-22 | End: 2025-06-27 | Stop reason: HOSPADM

## 2025-06-22 RX ORDER — MAGNESIUM SULFATE IN WATER 40 MG/ML
2000 INJECTION, SOLUTION INTRAVENOUS PRN
Status: DISCONTINUED | OUTPATIENT
Start: 2025-06-22 | End: 2025-06-27 | Stop reason: HOSPADM

## 2025-06-22 RX ORDER — CODEINE PHOSPHATE AND GUAIFENESIN 10; 100 MG/5ML; MG/5ML
5 SOLUTION ORAL EVERY 4 HOURS PRN
Status: DISCONTINUED | OUTPATIENT
Start: 2025-06-22 | End: 2025-06-27 | Stop reason: HOSPADM

## 2025-06-22 RX ORDER — ACETAMINOPHEN 325 MG/1
650 TABLET ORAL EVERY 6 HOURS PRN
Status: DISCONTINUED | OUTPATIENT
Start: 2025-06-22 | End: 2025-06-22 | Stop reason: HOSPADM

## 2025-06-22 RX ORDER — SODIUM CHLORIDE 0.9 % (FLUSH) 0.9 %
5-40 SYRINGE (ML) INJECTION PRN
Status: DISCONTINUED | OUTPATIENT
Start: 2025-06-22 | End: 2025-06-22 | Stop reason: HOSPADM

## 2025-06-22 RX ORDER — SODIUM CHLORIDE 0.9 % (FLUSH) 0.9 %
5-40 SYRINGE (ML) INJECTION PRN
Status: DISCONTINUED | OUTPATIENT
Start: 2025-06-22 | End: 2025-06-27 | Stop reason: HOSPADM

## 2025-06-22 RX ORDER — SODIUM CHLORIDE 0.9 % (FLUSH) 0.9 %
5-40 SYRINGE (ML) INJECTION EVERY 12 HOURS SCHEDULED
Status: DISCONTINUED | OUTPATIENT
Start: 2025-06-22 | End: 2025-06-27 | Stop reason: HOSPADM

## 2025-06-22 RX ORDER — SODIUM CHLORIDE 9 MG/ML
INJECTION, SOLUTION INTRAVENOUS PRN
Status: DISCONTINUED | OUTPATIENT
Start: 2025-06-22 | End: 2025-06-22 | Stop reason: HOSPADM

## 2025-06-22 RX ORDER — POLYETHYLENE GLYCOL 3350 17 G/17G
17 POWDER, FOR SOLUTION ORAL DAILY PRN
Status: DISCONTINUED | OUTPATIENT
Start: 2025-06-22 | End: 2025-06-27 | Stop reason: HOSPADM

## 2025-06-22 RX ORDER — POTASSIUM CHLORIDE 7.45 MG/ML
10 INJECTION INTRAVENOUS PRN
Status: DISCONTINUED | OUTPATIENT
Start: 2025-06-22 | End: 2025-06-27 | Stop reason: HOSPADM

## 2025-06-22 RX ORDER — PHENOL 1.4 %
10 AEROSOL, SPRAY (ML) MUCOUS MEMBRANE NIGHTLY
COMMUNITY

## 2025-06-22 RX ORDER — SODIUM CHLORIDE 0.9 % (FLUSH) 0.9 %
5-40 SYRINGE (ML) INJECTION EVERY 12 HOURS SCHEDULED
Status: DISCONTINUED | OUTPATIENT
Start: 2025-06-22 | End: 2025-06-22 | Stop reason: HOSPADM

## 2025-06-22 RX ORDER — ONDANSETRON 2 MG/ML
4 INJECTION INTRAMUSCULAR; INTRAVENOUS EVERY 6 HOURS PRN
Status: DISCONTINUED | OUTPATIENT
Start: 2025-06-22 | End: 2025-06-22 | Stop reason: HOSPADM

## 2025-06-22 RX ORDER — CARVEDILOL 12.5 MG/1
25 TABLET ORAL
Status: ACTIVE | OUTPATIENT
Start: 2025-06-22 | End: 2025-06-23

## 2025-06-22 RX ORDER — POLYETHYLENE GLYCOL 3350 17 G/17G
17 POWDER, FOR SOLUTION ORAL DAILY PRN
Status: DISCONTINUED | OUTPATIENT
Start: 2025-06-22 | End: 2025-06-22 | Stop reason: HOSPADM

## 2025-06-22 RX ORDER — SODIUM CHLORIDE 9 MG/ML
INJECTION, SOLUTION INTRAVENOUS PRN
Status: DISCONTINUED | OUTPATIENT
Start: 2025-06-22 | End: 2025-06-27 | Stop reason: HOSPADM

## 2025-06-22 RX ORDER — ATORVASTATIN CALCIUM 10 MG/1
10 TABLET, FILM COATED ORAL NIGHTLY
Status: DISCONTINUED | OUTPATIENT
Start: 2025-06-22 | End: 2025-06-27 | Stop reason: HOSPADM

## 2025-06-22 RX ORDER — ASPIRIN 81 MG/1
81 TABLET, CHEWABLE ORAL DAILY
COMMUNITY

## 2025-06-22 RX ORDER — AMLODIPINE BESYLATE 5 MG/1
5 TABLET ORAL ONCE
Status: DISCONTINUED | OUTPATIENT
Start: 2025-06-22 | End: 2025-06-22

## 2025-06-22 RX ORDER — ACETAMINOPHEN 650 MG/1
650 SUPPOSITORY RECTAL EVERY 6 HOURS PRN
Status: DISCONTINUED | OUTPATIENT
Start: 2025-06-22 | End: 2025-06-27 | Stop reason: HOSPADM

## 2025-06-22 RX ORDER — GABAPENTIN 100 MG/1
100 CAPSULE ORAL 2 TIMES DAILY
Status: DISCONTINUED | OUTPATIENT
Start: 2025-06-22 | End: 2025-06-27 | Stop reason: HOSPADM

## 2025-06-22 RX ORDER — DEXTROSE MONOHYDRATE 100 MG/ML
INJECTION, SOLUTION INTRAVENOUS CONTINUOUS PRN
Status: DISCONTINUED | OUTPATIENT
Start: 2025-06-22 | End: 2025-06-22 | Stop reason: HOSPADM

## 2025-06-22 RX ORDER — IOPAMIDOL 755 MG/ML
100 INJECTION, SOLUTION INTRAVASCULAR
Status: COMPLETED | OUTPATIENT
Start: 2025-06-22 | End: 2025-06-22

## 2025-06-22 RX ORDER — ONDANSETRON 4 MG/1
4 TABLET, ORALLY DISINTEGRATING ORAL EVERY 8 HOURS PRN
Status: DISCONTINUED | OUTPATIENT
Start: 2025-06-22 | End: 2025-06-27 | Stop reason: HOSPADM

## 2025-06-22 RX ORDER — LEVETIRACETAM 500 MG/1
250 TABLET ORAL 2 TIMES DAILY
Status: DISCONTINUED | OUTPATIENT
Start: 2025-06-22 | End: 2025-06-27 | Stop reason: HOSPADM

## 2025-06-22 RX ORDER — ACETAMINOPHEN 650 MG/1
650 SUPPOSITORY RECTAL EVERY 6 HOURS PRN
Status: DISCONTINUED | OUTPATIENT
Start: 2025-06-22 | End: 2025-06-22 | Stop reason: HOSPADM

## 2025-06-22 RX ORDER — INSULIN LISPRO 100 [IU]/ML
0-8 INJECTION, SOLUTION INTRAVENOUS; SUBCUTANEOUS
Status: DISCONTINUED | OUTPATIENT
Start: 2025-06-22 | End: 2025-06-22 | Stop reason: HOSPADM

## 2025-06-22 RX ORDER — GUAIFENESIN/DEXTROMETHORPHAN 100-10MG/5
10 SYRUP ORAL EVERY 6 HOURS PRN
COMMUNITY

## 2025-06-22 RX ORDER — SODIUM CHLORIDE 9 MG/ML
INJECTION, SOLUTION INTRAVENOUS CONTINUOUS
Status: DISCONTINUED | OUTPATIENT
Start: 2025-06-22 | End: 2025-06-24

## 2025-06-22 RX ORDER — CARVEDILOL 12.5 MG/1
25 TABLET ORAL ONCE
Status: COMPLETED | OUTPATIENT
Start: 2025-06-22 | End: 2025-06-22

## 2025-06-22 RX ORDER — LEVETIRACETAM 250 MG/1
250 TABLET ORAL ONCE
Status: COMPLETED | OUTPATIENT
Start: 2025-06-22 | End: 2025-06-22

## 2025-06-22 RX ORDER — ACETAMINOPHEN 325 MG/1
650 TABLET ORAL EVERY 6 HOURS PRN
Status: DISCONTINUED | OUTPATIENT
Start: 2025-06-22 | End: 2025-06-27 | Stop reason: HOSPADM

## 2025-06-22 RX ORDER — OXYCODONE HYDROCHLORIDE 5 MG/1
2.5 TABLET ORAL EVERY 4 HOURS PRN
Refills: 0 | Status: DISCONTINUED | OUTPATIENT
Start: 2025-06-22 | End: 2025-06-22 | Stop reason: HOSPADM

## 2025-06-22 RX ORDER — INSULIN LISPRO 100 [IU]/ML
0-8 INJECTION, SOLUTION INTRAVENOUS; SUBCUTANEOUS
Status: DISCONTINUED | OUTPATIENT
Start: 2025-06-22 | End: 2025-06-27 | Stop reason: HOSPADM

## 2025-06-22 RX ORDER — POTASSIUM CHLORIDE 750 MG/1
40 TABLET, EXTENDED RELEASE ORAL PRN
Status: DISCONTINUED | OUTPATIENT
Start: 2025-06-22 | End: 2025-06-27 | Stop reason: HOSPADM

## 2025-06-22 RX ORDER — FUROSEMIDE 40 MG/1
40 TABLET ORAL ONCE
Status: COMPLETED | OUTPATIENT
Start: 2025-06-22 | End: 2025-06-22

## 2025-06-22 RX ORDER — LEVOTHYROXINE SODIUM 100 UG/1
100 TABLET ORAL DAILY
Status: DISCONTINUED | OUTPATIENT
Start: 2025-06-22 | End: 2025-06-27 | Stop reason: HOSPADM

## 2025-06-22 RX ORDER — OXYCODONE HYDROCHLORIDE 5 MG/1
5 TABLET ORAL EVERY 4 HOURS PRN
Refills: 0 | Status: DISCONTINUED | OUTPATIENT
Start: 2025-06-22 | End: 2025-06-27 | Stop reason: HOSPADM

## 2025-06-22 RX ADMIN — SODIUM CHLORIDE: 0.9 INJECTION, SOLUTION INTRAVENOUS at 20:05

## 2025-06-22 RX ADMIN — Medication 5 ML: at 19:58

## 2025-06-22 RX ADMIN — FLUOXETINE HYDROCHLORIDE 40 MG: 20 CAPSULE ORAL at 19:57

## 2025-06-22 RX ADMIN — ATORVASTATIN CALCIUM 10 MG: 10 TABLET, FILM COATED ORAL at 21:27

## 2025-06-22 RX ADMIN — FUROSEMIDE 40 MG: 40 TABLET ORAL at 09:09

## 2025-06-22 RX ADMIN — VANCOMYCIN HYDROCHLORIDE 1750 MG: 10 INJECTION, POWDER, LYOPHILIZED, FOR SOLUTION INTRAVENOUS at 19:51

## 2025-06-22 RX ADMIN — GABAPENTIN 100 MG: 100 CAPSULE ORAL at 21:28

## 2025-06-22 RX ADMIN — CARVEDILOL 25 MG: 12.5 TABLET, FILM COATED ORAL at 09:09

## 2025-06-22 RX ADMIN — LEVETIRACETAM 250 MG: 250 TABLET, FILM COATED ORAL at 09:09

## 2025-06-22 RX ADMIN — METRONIDAZOLE 500 MG: 500 INJECTION, SOLUTION INTRAVENOUS at 13:49

## 2025-06-22 RX ADMIN — IOPAMIDOL 40 ML: 755 INJECTION, SOLUTION INTRAVENOUS at 10:49

## 2025-06-22 RX ADMIN — LEVETIRACETAM 250 MG: 500 TABLET, FILM COATED ORAL at 21:27

## 2025-06-22 RX ADMIN — LEVOTHYROXINE SODIUM 100 MCG: 0.1 TABLET ORAL at 19:57

## 2025-06-22 RX ADMIN — CEFEPIME 2000 MG: 2 INJECTION, POWDER, FOR SOLUTION INTRAVENOUS at 12:38

## 2025-06-22 RX ADMIN — INSULIN LISPRO 4 UNITS: 100 INJECTION, SOLUTION INTRAVENOUS; SUBCUTANEOUS at 21:28

## 2025-06-22 ASSESSMENT — LIFESTYLE VARIABLES
HOW OFTEN DO YOU HAVE A DRINK CONTAINING ALCOHOL: NEVER
HOW MANY STANDARD DRINKS CONTAINING ALCOHOL DO YOU HAVE ON A TYPICAL DAY: PATIENT DOES NOT DRINK

## 2025-06-22 NOTE — PROGRESS NOTES
Day #1   Indication:  Empyema  Current regimen:  1 gm q12h  Abx regimen: Vancomycin and Cefepime  Recent Labs     25  0841   WBC 12.9*   CREATININE 1.31*   BUN 21*     Body mass index is 26.63 kg/m².  Est CrCl: 37 ml/min; UO: n/a ml/kg/hr  Temp (24hrs), Av.1 °F (36.7 °C), Min:97.9 °F (36.6 °C), Max:98.3 °F (36.8 °C)    Cultures: None    Plan: Change to Cefepime 2gm q12h

## 2025-06-22 NOTE — PROGRESS NOTES
Perfect serve message sent to IR on call Murray re: loculated pleural effusion/empyema. He is reviewing images to determine appropriate intervention    Spoke with IR, feels that this is an abscess 2/2 air fluid levels and that chest tube placement could lead to bronchopleural fistula. Recommending transfer to [facility] for thoracic eval for possible decortication.

## 2025-06-22 NOTE — PROGRESS NOTES
Thoracic surgery note    Thoracic surgery received consult. Discussed with IR concern for pulmonary abscess and treatment is prolonged antibiotics. no surgical intervention at this time no need for NPO on Monday. No need for pigtail placement at this time. Will need to have records from last admission at OSH and imaging transferred for review.     Full consult note tomorrow .

## 2025-06-22 NOTE — ED TRIAGE NOTES
Patient arrives to ed via pov with c/o cough, chest congestion, and shortness of breath x 2 weeks, pt sts he was told he had pneumonia at [facility] and admitted for 4 days and then went to SNF where he stayed for 2 weeks, ending today when insurance \"ran out\" however sts he feels bad still.

## 2025-06-22 NOTE — H&P
History and Physical    Date of Service:  6/22/2025  Primary Care Provider: PHYLLIS Mathis MD  Source of information: The patient and Chart review    Chief Complaint: No chief complaint on file.      History of Presenting Illness:   Harman Streeter is a 85 y.o. male who was transferred from [facility] for thoracic surgery evaluation for empyema.  Patient with recent admission at [facility] with pneumonia and was subsequently discharged to SNF.  Patient was discharged from SNF this morning to home.  Patient reports persistent symptoms of cough, shortness of breath and chest pain and patient subsequently presented to [facility] ER.  CTA of the chest done in the ER shows a fluid hyperdense level with loculated right pleural effusion concerning for empyema or bronchopleural fistula.  Labs remarkable for creatinine of 1.3 also with leukocytosis with WBC count of over 12,000 and mild elevated platelet count of over 400,000.  Subsequently case was discussed with IR therefore possible chest tube however after IR reviewed the imaging and no chest tube placement was recommended due to concern of bronchopleural fistula and patient was subsequently transferred to [facility] for thoracic surgery evaluation.    The patient denies any headache, blurry vision, sore throat, trouble swallowing, trouble with speech, chest pain, SOB, cough, fever, chills, N/V/D, abd pain, urinary symptoms, constipation, recent travels, sick contacts, focal or generalized neurological symptoms, falls, injuries, rashes, contact with COVID-19 diagnosed patients, hematemesis, melena, hemoptysis, hematuria, rashes, denies starting any new medications and denies any other concerns or problems besides as mentioned above.         REVIEW OF SYSTEMS:  A comprehensive review of systems was negative except for that written in the History of Present Illness.     Past Medical History:

## 2025-06-22 NOTE — ED PROVIDER NOTES
[facility] EMERGENCY DEPARTMENT  EMERGENCY DEPARTMENT ENCOUNTER      Patient Name: Harman Streeter  MRN: [id number]  Birthdate 1940  Date of Evaluation: 6/22/2025  Physician: Vasquez Isabel MD    CHIEF COMPLAINT       Chief Complaint   Patient presents with    Chest Congestion    Shortness of Breath       HISTORY OF PRESENT ILLNESS   (Location/Symptom, Timing/Onset, Context/Setting, Quality, Duration, Modifying Factors, Severity)   Harman Streeter, 85 y.o., male     85-year-old male with a history of atrial fibrillation, diabetes, heart failure, hypertension presents with chief complaint of cough, shortness of breath and chest pain.  Patient was recently diagnosed with pneumonia which was treated at [facility].  He was discharged to a SNF.  He was discharged from the SNF this morning due to insurance reasons.  He reports persistent symptoms.  He is not sure if he is still on antibiotics          Nursing Notes were reviewed.    REVIEW OF SYSTEMS    (Not required)   Review of Systems    Except as noted above the remainder of the review of systems was reviewed and negative.     PAST MEDICAL HISTORY     Past Medical History:   Diagnosis Date    Acquired hypothyroidism 7/31/2017    Arrhythmia     Arthritis     Atrial fibrillation (HCC)     Colon polyp     Depression     DM (diabetes mellitus) (HCC) 9/2/2011    DIET-CONTROLLED    GERD (gastroesophageal reflux disease) 6/21/2011    AFTER SPICY FOODS     Heart failure (HCC)     HTN (hypertension) 6/21/2011    Hyperlipidemia 6/21/2011    Ill-defined condition     33 lb intentional wt loss over 18-24 months    Moderate vision loss of one eye     LEFT EYE VISION LOSS    GAIL on CPAP     Unspecified sleep apnea     Uses CPAP    Vitamin D deficiency 1/28/2014       SURGICAL HISTORY       Past Surgical History:   Procedure Laterality Date    COLONOSCOPY  10/09    neg. rep 10 yrs.    COLONOSCOPY N/A 3/6/2019    COLONOSCOPY performed by Robbin Sy MD at [facility]

## 2025-06-22 NOTE — CONSULTS
Interventional Radiology    Consulted re: 85 year old male with recent PNA dx, discharged from SNF, presents with cough and elevated WBC. CT shows a small right lower lobe air fluid collection with adjacent consolidation. This is likely a pulmonary abscess. First line therapy is abx, with percutaneous drainage and surgery reserved for refractory cases, but with higher risk of causing a bronchopleural fistula if these are treated with catheter drainage. As such, medical management with abx recommended at this time.    Anil Gao M.D  Interventional Radiology  [facility]    
BE SEEN) 8/30/22  Yes Automatic Reconciliation, Ar   FLUoxetine (PROZAC) 40 MG capsule TAKE 1 CAPSULE DAILY (DUE TO BE SEEN) 7/12/20  Yes Automatic Reconciliation, Ar   furosemide (LASIX) 40 MG tablet Take 1 tablet by mouth daily   Yes Automatic Reconciliation, Ar   amLODIPine (NORVASC) 5 MG tablet Take 1 tablet by mouth daily  Patient not taking: Reported on 6/22/2025    Ian Pierre MD   gabapentin (NEURONTIN) 100 MG capsule Take 1 capsule by mouth in the morning and at bedtime. Max Daily Amount: 200 mg  Patient not taking: Reported on 6/22/2025    Provider, MD Ian   diphenoxylate-atropine (LOMOTIL) 2.5-0.025 MG per tablet TAKE 1 TABLET BY MOUTH 4 TIMES A DAY AS NEEDED FOR DIARRHEA (MAX 4 TABLETS/DAY)  Patient not taking: Reported on 6/22/2025 1/28/17   Automatic Reconciliation, Ar   Icosapent Ethyl (VASCEPA) 1 g CAPS capsule Take 2 capsules by mouth  Patient not taking: Reported on 6/22/2025    Automatic Reconciliation, Ar   levothyroxine (SYNTHROID) 100 MCG tablet Take 1 tablet by mouth Daily  Patient not taking: Reported on 6/22/2025 4/25/17   Automatic Reconciliation, Ar   SITagliptin (JANUVIA) 50 MG tablet Take 50 mg by mouth daily  Patient not taking: Reported on 6/22/2025 4/16/19   Automatic Reconciliation, Ar         Review of Systems:  (bold if positive, if negative)    Gen:  Eyes:  ENT:  CVS:  Pulm:  HGI:  :  MS:  Skin:  Psych:  Endo:  Hem:  Renal:  Neuro:     Cough, dyspnea, sputum, malaise      Objective:      VITALS:    Vital signs reviewed; most recent are:    Vitals:    06/22/25 0909   BP: (!) 161/57   Pulse: 70   Resp:    Temp:    SpO2:      SpO2 Readings from Last 6 Encounters:   06/22/25 97%   06/08/24 98%   05/10/24 97%   05/08/24 97%        No intake or output data in the 24 hours ending 06/22/25 1354         Exam:     Physical Exam:    Gen: elderly male   HEENT:  Pink conjunctivae, PERRL, hearing intact to voice, moist mucous membranes  Neck:  Supple, without masses, thyroid

## 2025-06-23 PROBLEM — J86.9 EMPYEMA LUNG (HCC): Status: ACTIVE | Noted: 2025-06-23

## 2025-06-23 PROBLEM — Z51.5 PALLIATIVE CARE ENCOUNTER: Status: ACTIVE | Noted: 2025-06-23

## 2025-06-23 PROBLEM — Z71.89 GOALS OF CARE, COUNSELING/DISCUSSION: Status: ACTIVE | Noted: 2025-06-23

## 2025-06-23 PROBLEM — Z71.89 ADVANCED CARE PLANNING/COUNSELING DISCUSSION: Status: ACTIVE | Noted: 2025-06-23

## 2025-06-23 LAB
ANION GAP SERPL CALC-SCNC: 8 MMOL/L (ref 2–12)
BASOPHILS # BLD: 0.1 K/UL (ref 0–0.1)
BASOPHILS NFR BLD: 0.9 % (ref 0–1)
BUN SERPL-MCNC: 18 MG/DL (ref 6–20)
BUN/CREAT SERPL: 19 (ref 12–20)
CALCIUM SERPL-MCNC: 8.1 MG/DL (ref 8.5–10.1)
CHLORIDE SERPL-SCNC: 106 MMOL/L (ref 97–108)
CO2 SERPL-SCNC: 27 MMOL/L (ref 21–32)
CREAT SERPL-MCNC: 0.96 MG/DL (ref 0.7–1.3)
DIFFERENTIAL METHOD BLD: ABNORMAL
EKG ATRIAL RATE: 75 BPM
EKG DIAGNOSIS: NORMAL
EKG P AXIS: 46 DEGREES
EKG P-R INTERVAL: 180 MS
EKG Q-T INTERVAL: 432 MS
EKG QRS DURATION: 86 MS
EKG QTC CALCULATION (BAZETT): 482 MS
EKG R AXIS: 12 DEGREES
EKG T AXIS: 50 DEGREES
EKG VENTRICULAR RATE: 75 BPM
EOSINOPHIL # BLD: 0.22 K/UL (ref 0–0.4)
EOSINOPHIL NFR BLD: 1.9 % (ref 0–7)
ERYTHROCYTE [DISTWIDTH] IN BLOOD BY AUTOMATED COUNT: 13.1 % (ref 11.5–14.5)
GLUCOSE BLD STRIP.AUTO-MCNC: 187 MG/DL (ref 65–117)
GLUCOSE BLD STRIP.AUTO-MCNC: 191 MG/DL (ref 65–117)
GLUCOSE BLD STRIP.AUTO-MCNC: 228 MG/DL (ref 65–117)
GLUCOSE BLD STRIP.AUTO-MCNC: 232 MG/DL (ref 65–117)
GLUCOSE SERPL-MCNC: 166 MG/DL (ref 65–100)
HCT VFR BLD AUTO: 28.7 % (ref 36.6–50.3)
HGB BLD-MCNC: 9.8 G/DL (ref 12.1–17)
IMM GRANULOCYTES # BLD AUTO: 0.03 K/UL (ref 0–0.04)
IMM GRANULOCYTES NFR BLD AUTO: 0.3 % (ref 0–0.5)
LYMPHOCYTES # BLD: 1.62 K/UL (ref 0.8–3.5)
LYMPHOCYTES NFR BLD: 13.8 % (ref 12–49)
MCH RBC QN AUTO: 31.4 PG (ref 26–34)
MCHC RBC AUTO-ENTMCNC: 34.1 G/DL (ref 30–36.5)
MCV RBC AUTO: 92 FL (ref 80–99)
MONOCYTES # BLD: 1.23 K/UL (ref 0–1)
MONOCYTES NFR BLD: 10.5 % (ref 5–13)
NEUTS SEG # BLD: 8.55 K/UL (ref 1.8–8)
NEUTS SEG NFR BLD: 72.6 % (ref 32–75)
NRBC # BLD: 0 K/UL (ref 0–0.01)
NRBC BLD-RTO: 0 PER 100 WBC
PLATELET # BLD AUTO: 294 K/UL (ref 150–400)
PMV BLD AUTO: 9.9 FL (ref 8.9–12.9)
POTASSIUM SERPL-SCNC: 2.9 MMOL/L (ref 3.5–5.1)
RBC # BLD AUTO: 3.12 M/UL (ref 4.1–5.7)
SERVICE CMNT-IMP: ABNORMAL
SODIUM SERPL-SCNC: 141 MMOL/L (ref 136–145)
WBC # BLD AUTO: 11.8 K/UL (ref 4.1–11.1)

## 2025-06-23 PROCEDURE — 82962 GLUCOSE BLOOD TEST: CPT

## 2025-06-23 PROCEDURE — 93010 ELECTROCARDIOGRAM REPORT: CPT | Performed by: INTERNAL MEDICINE

## 2025-06-23 PROCEDURE — 6370000000 HC RX 637 (ALT 250 FOR IP): Performed by: FAMILY MEDICINE

## 2025-06-23 PROCEDURE — 2500000003 HC RX 250 WO HCPCS: Performed by: FAMILY MEDICINE

## 2025-06-23 PROCEDURE — 2060000000 HC ICU INTERMEDIATE R&B

## 2025-06-23 PROCEDURE — 99221 1ST HOSP IP/OBS SF/LOW 40: CPT | Performed by: STUDENT IN AN ORGANIZED HEALTH CARE EDUCATION/TRAINING PROGRAM

## 2025-06-23 PROCEDURE — 99223 1ST HOSP IP/OBS HIGH 75: CPT | Performed by: INTERNAL MEDICINE

## 2025-06-23 PROCEDURE — G0545 PR INHERENT VISIT TO INPT: HCPCS | Performed by: INTERNAL MEDICINE

## 2025-06-23 PROCEDURE — 6370000000 HC RX 637 (ALT 250 FOR IP): Performed by: NURSE PRACTITIONER

## 2025-06-23 PROCEDURE — 6360000002 HC RX W HCPCS

## 2025-06-23 PROCEDURE — 2580000003 HC RX 258: Performed by: FAMILY MEDICINE

## 2025-06-23 PROCEDURE — 99222 1ST HOSP IP/OBS MODERATE 55: CPT | Performed by: NURSE PRACTITIONER

## 2025-06-23 PROCEDURE — 6360000002 HC RX W HCPCS: Performed by: FAMILY MEDICINE

## 2025-06-23 PROCEDURE — APPNB30 APP NON BILLABLE TIME 0-30 MINS

## 2025-06-23 PROCEDURE — 80048 BASIC METABOLIC PNL TOTAL CA: CPT

## 2025-06-23 PROCEDURE — 85025 COMPLETE CBC W/AUTO DIFF WBC: CPT

## 2025-06-23 PROCEDURE — 2500000003 HC RX 250 WO HCPCS

## 2025-06-23 PROCEDURE — 6370000000 HC RX 637 (ALT 250 FOR IP)

## 2025-06-23 RX ORDER — CARVEDILOL 12.5 MG/1
25 TABLET ORAL 2 TIMES DAILY WITH MEALS
Status: DISCONTINUED | OUTPATIENT
Start: 2025-06-23 | End: 2025-06-27 | Stop reason: HOSPADM

## 2025-06-23 RX ORDER — DEXTROSE MONOHYDRATE 100 MG/ML
INJECTION, SOLUTION INTRAVENOUS CONTINUOUS PRN
Status: DISCONTINUED | OUTPATIENT
Start: 2025-06-23 | End: 2025-06-27 | Stop reason: HOSPADM

## 2025-06-23 RX ORDER — METRONIDAZOLE 250 MG/1
500 TABLET ORAL EVERY 8 HOURS SCHEDULED
Status: DISCONTINUED | OUTPATIENT
Start: 2025-06-23 | End: 2025-06-24

## 2025-06-23 RX ORDER — GUAIFENESIN 600 MG/1
600 TABLET, EXTENDED RELEASE ORAL 2 TIMES DAILY
Status: COMPLETED | OUTPATIENT
Start: 2025-06-23 | End: 2025-06-25

## 2025-06-23 RX ORDER — HYDRALAZINE HYDROCHLORIDE 20 MG/ML
10 INJECTION INTRAMUSCULAR; INTRAVENOUS EVERY 6 HOURS PRN
Status: DISCONTINUED | OUTPATIENT
Start: 2025-06-23 | End: 2025-06-27 | Stop reason: HOSPADM

## 2025-06-23 RX ADMIN — Medication 5 ML: at 18:03

## 2025-06-23 RX ADMIN — POTASSIUM CHLORIDE 10 MEQ: 7.46 INJECTION, SOLUTION INTRAVENOUS at 10:56

## 2025-06-23 RX ADMIN — GUAIFENESIN 600 MG: 600 TABLET, EXTENDED RELEASE ORAL at 21:22

## 2025-06-23 RX ADMIN — SODIUM CHLORIDE, PRESERVATIVE FREE 10 ML: 5 INJECTION INTRAVENOUS at 21:23

## 2025-06-23 RX ADMIN — GABAPENTIN 100 MG: 100 CAPSULE ORAL at 08:34

## 2025-06-23 RX ADMIN — HYDRALAZINE HYDROCHLORIDE 10 MG: 20 INJECTION INTRAMUSCULAR; INTRAVENOUS at 08:23

## 2025-06-23 RX ADMIN — LEVETIRACETAM 250 MG: 500 TABLET, FILM COATED ORAL at 08:34

## 2025-06-23 RX ADMIN — SODIUM CHLORIDE, PRESERVATIVE FREE 10 ML: 5 INJECTION INTRAVENOUS at 08:28

## 2025-06-23 RX ADMIN — INSULIN LISPRO 2 UNITS: 100 INJECTION, SOLUTION INTRAVENOUS; SUBCUTANEOUS at 18:05

## 2025-06-23 RX ADMIN — POTASSIUM CHLORIDE 10 MEQ: 7.46 INJECTION, SOLUTION INTRAVENOUS at 14:08

## 2025-06-23 RX ADMIN — POTASSIUM CHLORIDE 10 MEQ: 7.46 INJECTION, SOLUTION INTRAVENOUS at 15:16

## 2025-06-23 RX ADMIN — INSULIN LISPRO 2 UNITS: 100 INJECTION, SOLUTION INTRAVENOUS; SUBCUTANEOUS at 08:21

## 2025-06-23 RX ADMIN — METRONIDAZOLE 500 MG: 250 TABLET ORAL at 21:22

## 2025-06-23 RX ADMIN — POTASSIUM CHLORIDE 10 MEQ: 7.46 INJECTION, SOLUTION INTRAVENOUS at 13:11

## 2025-06-23 RX ADMIN — POTASSIUM CHLORIDE 10 MEQ: 7.46 INJECTION, SOLUTION INTRAVENOUS at 12:05

## 2025-06-23 RX ADMIN — ATORVASTATIN CALCIUM 10 MG: 10 TABLET, FILM COATED ORAL at 21:22

## 2025-06-23 RX ADMIN — GABAPENTIN 100 MG: 100 CAPSULE ORAL at 21:22

## 2025-06-23 RX ADMIN — LEVOTHYROXINE SODIUM 100 MCG: 0.1 TABLET ORAL at 08:21

## 2025-06-23 RX ADMIN — POTASSIUM CHLORIDE 10 MEQ: 7.46 INJECTION, SOLUTION INTRAVENOUS at 16:36

## 2025-06-23 RX ADMIN — CEFEPIME 2000 MG: 2 INJECTION, POWDER, FOR SOLUTION INTRAVENOUS at 04:33

## 2025-06-23 RX ADMIN — INSULIN LISPRO 2 UNITS: 100 INJECTION, SOLUTION INTRAVENOUS; SUBCUTANEOUS at 22:35

## 2025-06-23 RX ADMIN — INSULIN LISPRO 2 UNITS: 100 INJECTION, SOLUTION INTRAVENOUS; SUBCUTANEOUS at 12:13

## 2025-06-23 RX ADMIN — FLUOXETINE HYDROCHLORIDE 40 MG: 20 CAPSULE ORAL at 08:21

## 2025-06-23 RX ADMIN — LEVETIRACETAM 250 MG: 500 TABLET, FILM COATED ORAL at 21:22

## 2025-06-23 RX ADMIN — WATER 2000 MG: 1 INJECTION INTRAMUSCULAR; INTRAVENOUS; SUBCUTANEOUS at 15:28

## 2025-06-23 RX ADMIN — Medication 5 ML: at 22:34

## 2025-06-23 RX ADMIN — Medication 5 ML: at 09:22

## 2025-06-23 RX ADMIN — SODIUM CHLORIDE: 0.9 INJECTION, SOLUTION INTRAVENOUS at 13:48

## 2025-06-23 RX ADMIN — CARVEDILOL 25 MG: 12.5 TABLET, FILM COATED ORAL at 18:05

## 2025-06-23 RX ADMIN — METRONIDAZOLE 500 MG: 250 TABLET ORAL at 15:28

## 2025-06-23 ASSESSMENT — ENCOUNTER SYMPTOMS
VOMITING: 0
CONSTIPATION: 0
TROUBLE SWALLOWING: 0
STRIDOR: 0
SORE THROAT: 0
VOICE CHANGE: 0
BACK PAIN: 0
DIARRHEA: 0
WHEEZING: 0
EYE PAIN: 0
SHORTNESS OF BREATH: 1
ABDOMINAL PAIN: 0
NAUSEA: 0
COUGH: 0

## 2025-06-23 NOTE — CONSULTS
Palliative Medicine  Patient Name: Harman Streeter  YOB: 1940  MRN: [id number]  Age: 85 y.o.  Gender: male    Date of Initial Consult: 2025  Date of Service: 2025  Time: 11:43 PM  Provider: RADHA Fowler NP  Hospital Day: 2  Admit Date: 2025  Referring Provider: Dr. Maciel      Reasons for Consultation:  Goals of Care    HISTORY OF PRESENT ILLNESS (HPI):   Harman Streeter is a 85 y.o. male with a past medical history of DM2, GERD, HTN, XOL and A-fib, who initially presented to [facility] after having just been  discharged from [facility] earlier in the day, with CC of cough, chest congestion and SOB, reporting recent  hospitalization for PNA at [facility] x 4 days. Mr. Chakraborty was at [facility] x 2 weeks, discharged today, however he still felt bad so he went straight to [facility], where he was found to have elevated WBC and chest CT showed small right lower lobe air-fluid collection with adjacent consolidation, concerning for pulmonary abscess.  IR was consulted and deemed patient to be at high risk for causing a bronchopleural fistula, recommended management with IV ABX and transferred to [facility] for thoracic surgery consult.  Mr. Chakraborty was admitted on 2025 with dx of OSMAN and for thoracic evaluation for possible decortication of loculated pleural effusion/empyema.    Course of hospitalization-   - IR consulted, state 1st line therapy is abx treatment.  Percutaneous drainage and surgery would be considered for refractory cases.    -  ID following, recommend long course (2 months) IV abx    Psychosocial: , wife  approximately 11 years ago from cancer.  Lives by himself, has 2 daughters, 1 daughter that lives locally and the other lives in [place].  Mr. Streeter stated that he has limited contact with daughter in [place], had a falling out.       PALLIATIVE DIAGNOSES:    Palliative care encounter  DNR discussion  Weakness, generalized  Advanced Care Planning Discussion  Goals of  care discussion    ASSESSMENT AND PLAN:   Palliative consulted to assist Mr. Harman Streeter  with GOC discussion.  Prior to visit completed extensive chart review and discussed with patients nurse and ID providers Latanya PACHECO and Dr. Riley.    Met with Mr. Streeter, no family at bedside. Introduced myself and role of palliative.   Mr. Streeter was awake, alert and oriented, able to tell me has \"a pocket of infection\" in his lung and is waiting to hear if he will need procedure to drain the infection vs tx w/ abx.    Advanced Care Planning discussion- Mr. Streeter has an AMD in EMR, designating his wife as #1 mpoa and daughter Clau Streeter as #2 mpoa, HOWEVER, patient stated AMD on file is old, he now has his daughter Anjana most recent AMD.    1. Wife is  and patient stated that he does not want us to call his daughter Clau Rojas, apparently they have had a falling out of sorts.  2. Mr. Streeter informed me that after his wife , he completed a new AMD,  designating his daughter Pamela Pereyra to serve as his medical poa.   3. Will ask daughter to bring in copy of most recent AMD.    DNR discussion- Mr. Streeter continues to have a Full Code status.    1. Mr. Streeter wants to prolong life, however indicates he has limits, wants to discuss with his daughter.     GOC Discussion- Ultimately, Mr. Streeter wants to return home, does not think he made much progress at SNF.   Unable to speak with daughter today, will try again tomorrow.     Initial consult note routed to primary continuity provider and/or primary health care team members    Please call with any palliative questions or concerns.  Palliative Care Team is available via perfect serve or via phone.    Referrals to:   [] Outpatient Palliative Care  [] Home Based Palliative Care  [] Home Based Primary Care  [] Hospice       ADVANCE CARE PLANNING:   [x] [facility] Interdisciplinary Team has updated the ACP Navigator with Health Care Decision Maker and Patient Capacity

## 2025-06-23 NOTE — CARE COORDINATION
RUR: 12%  Readmission? No  IM? Yes, 1st IM given 6/23  ? N/A    Plan: Home with outpatient services unless otherwise recommended. Palliative, ID, and thoracic surgery following.    CM met with pt at bedside to discuss discharge plan and complete initial case management assessment. Pt confirmed that his family will provide him with transportation home from the hospital.    [id number] [id number] - P [phone] - F [phone] [[id number]]     Pt lives alone at the address listed on his facesheet. Pt has a walker at home and reports being independent in all ADLs/IADLs. Pt does not have a hx of home health services or home O2. Pt has a hx of a recent stay at [facility]. Pt was discharged when he ran out of Medicare SNF days. Pt is a , but is not connected with [facility]. Pt lives in a one-story home with three steps to enter. Pt does not feel that he needs any other resources and/or services at home or in the community at this time.    Case management following for discharge planning.     06/23/25 1614   Service Assessment   Patient Orientation Alert and Oriented   Cognition Alert   History Provided By Patient   Primary Caregiver Self   Accompanied By/Relationship None   Support Systems Children   Patient's Healthcare Decision Maker is: Legal Next of Kin   PCP Verified by CM Yes  (Clemente Moran)   Last Visit to PCP Within last 3 months   Prior Functional Level Independent in ADLs/IADLs   Current Functional Level Independent in ADLs/IADLs   Can patient return to prior living arrangement Yes   Ability to make needs known: Fair   Family able to assist with home care needs: No   Would you like for me to discuss the discharge plan with any other family members/significant others, and if so, who? Yes  (Daughter, Pamela Pereyra, [phone])   Financial Resources Medicare   Community Resources None   CM/SW Referral Other (see  Conversation in minutes:  <16 minutes (Non-Billable)    Dilia Ferrari LM,   [phone]

## 2025-06-23 NOTE — CONSULTS
Thoracic Surgery  Consult Note  History and Physical    Chief Complaint: Concern for empyema, not feeling well      Inpatient consult to Thoracic Surgery  Consult performed by: Vilma Xavier MD  Consult ordered by: Miller Maciel MD         Patient Active Problem List   Diagnosis    GERD (gastroesophageal reflux disease)    Hyperlipidemia    GAIL (obstructive sleep apnea)    TIA (transient ischemic attack)    Type 2 diabetes with nephropathy (HCC)    Seizure disorder (HCC)    Vitamin D deficiency    Encounter for long-term (current) drug use    Hypertension complicating diabetes (HCC)    Complete tear of right ACL    Acquired hypothyroidism    Pleural effusion    Empyema (HCC)    Abscess of lower lobe of right lung without pneumonia (HCC)       HPI: Harman Streeter is a 85 y.o. male presenting with cough, shortness of breath and chest pain. Recently admitted to an OSH diagnosed with pneumonia and discharged to a SNF. He was discharged from the SNF yesterday AM and presented to [facility] ED. CT there demonstrated loculation of the right lower lung field concerning for empyema or lung abscess. He was sent to [facility] for thoracic surgery consultation.  Denies any prior thoracentesis or intervention in the right chest at last hospitalization.     Past Medical History:   Diagnosis Date    Acquired hypothyroidism 7/31/2017    Arrhythmia     Arthritis     Atrial fibrillation (HCC)     Colon polyp     Depression     DM (diabetes mellitus) (HCC) 9/2/2011    DIET-CONTROLLED    GERD (gastroesophageal reflux disease) 6/21/2011    AFTER SPICY FOODS     Heart failure (HCC)     HTN (hypertension) 6/21/2011    Hyperlipidemia 6/21/2011    Ill-defined condition     33 lb intentional wt loss over 18-24 months    Moderate vision loss of one eye     LEFT EYE VISION LOSS    GAIL on CPAP     Unspecified sleep apnea     Uses CPAP    Vitamin D deficiency 1/28/2014       Past Surgical History:   Procedure Laterality Date    COLONOSCOPY  10/09     address on file.    Patient Active Problem List   Diagnosis    GERD (gastroesophageal reflux disease)    Hyperlipidemia    GAIL (obstructive sleep apnea)    TIA (transient ischemic attack)    Type 2 diabetes with nephropathy (HCC)    Seizure disorder (HCC)    Vitamin D deficiency    Encounter for long-term (current) drug use    Hypertension complicating diabetes (HCC)    Complete tear of right ACL    Acquired hypothyroidism    Pleural effusion    Empyema (HCC)    Abscess of lower lobe of right lung without pneumonia (HCC)       Plan:  No acute thoracic surgery intervention  Consider pigtail placement by IR if failure to improve on conservative management with antibiotics; however, with only some leukocytosis and no tachycardia, hypotension, or need for oxygen will likely improve with antibiotics.  Recommend having OSH push over prior imaging and obtaining records for review.     Thank you for consulting me to care for this patient. If you have any further questions or concerns regarding this patient, please reach out.     I personally spent 35 minutes on this encounter. I spent time preparing for the visit and conducting a comprehensive chart review, and review of outsided faxed records; reviewing and personally interpreting multiple radiographic studies, discussion with referring provider; communication with the patient's care team, and documenting clinical information in the electronic health record. The majority of the time was spent obtaining and/or reviewing separately obtained history; a targeted physical exam and evaluation; face to face counseling of the patient, family, and care providers of the proposed treatment/surgery/plan.    Vilma Xavier MD  Thoracic Surgeon  [facility] Thoracic Surgery at [address], [address]  Phone: [phone]  Fax: [phone]

## 2025-06-23 NOTE — PROGRESS NOTES
Hospitalist Progress Note  Miller Maciel MD  Answering service: [phone]        Date of Service:  2025  NAME:  Harman Streeter  :  1940  MRN:  [id number]      Admission Summary:     Patient transferred from [facility] with empyema.    Interval history / Subjective:     Patient reported some coughing.     Assessment & Plan:     Empyema  - Patient with recent admission at [facility] for pneumonia presented with persistent symptoms of chest pain, shortness of breath and cough  - CTA of the chest shows a fluid hyperdense level with loculated right pleural effusion concerning for empyema or bronchopleural fistula  - Continue broad-spectrum antibiotic with vancomycin and cefepime  -Per thoracic surgery, recommended nonoperative management with IV antibiotic  - ID and thoracic surgery following     OSMAN  - Probably prerenal, rule out ATN from infection  - Continue gentle IV fluid hydration and monitor renal function     Leukocytosis  - Patient with WBC count of over 12,000 on presentation, otherwise no accompanying features of sepsis  - This is likely due to an underlying empyema  - Manage underlying etiology, monitor WBC     Thrombocytosis  - This is likely reactive due to infectious process  - Resolved     Hypertension  - Continue Coreg  - Monitor blood pressure     Diabetes type 2  - Hold Januvia  - Continue insulin sliding coverage, monitor blood sugars     Dyslipidemia  - Continue statin     Hypothyroidism  - Continue Synthroid     Seizure disorder  - Continue Keppra     Mood disorder  - Continue Prozac     Code status: Full  Prophylaxis: Lovenox  Care Plan discussed with: Patient and care team  Anticipated Disposition: More than 48 hours  Central Line:   None    CRITICAL CARE ATTESTATION:  I had a face to face encounter with the patient, reviewed and interpreted patient data including  IntraVENous PRN    0.9 % sodium chloride infusion   IntraVENous PRN    potassium chloride (KLOR-CON) extended release tablet 40 mEq  40 mEq Oral PRN    Or    potassium bicarb-citric acid (EFFER-K) effervescent tablet 40 mEq  40 mEq Oral PRN    Or    potassium chloride 10 mEq/100 mL IVPB (Peripheral Line)  10 mEq IntraVENous PRN    magnesium sulfate 2000 mg in 50 mL IVPB premix  2,000 mg IntraVENous PRN    ondansetron (ZOFRAN-ODT) disintegrating tablet 4 mg  4 mg Oral Q8H PRN    Or    ondansetron (ZOFRAN) injection 4 mg  4 mg IntraVENous Q6H PRN    polyethylene glycol (GLYCOLAX) packet 17 g  17 g Oral Daily PRN    acetaminophen (TYLENOL) tablet 650 mg  650 mg Oral Q6H PRN    Or    acetaminophen (TYLENOL) suppository 650 mg  650 mg Rectal Q6H PRN    oxyCODONE (ROXICODONE) immediate release tablet 5 mg  5 mg Oral Q4H PRN    ceFEPIme (MAXIPIME) 2,000 mg in sodium chloride 0.9 % 100 mL IVPB (addEASE)  2,000 mg IntraVENous Q12H    vancomycin (VANCOCIN) 1,000 mg in sodium chloride 0.9 % 250 mL IVPB ([id number])  1,000 mg IntraVENous Q24H    vancomycin (VANCOCIN) - Pharmacy to Dose  1 each Other RX Placeholder    guaiFENesin-codeine (guaiFENesin AC) 100-10 MG/5ML liquid 5 mL  5 mL Oral Q4H PRN     ______________________________________________________________________  EXPECTED LENGTH OF STAY: 2  ACTUAL LENGTH OF STAY:          1                 Miller Maciel MD

## 2025-06-23 NOTE — CONSULTS
Infectious Disease Consult    INFECTIOUS DISEASE Attending:     I agree with the above infectious disease daily progress note in its entirety as authored by and discussed in detail with the nurse practitioner.   I have reviewed pertinent laboratory studies, microbiology cultures, radiologic reports with review of the consultations and progress notes as appropriate.   I agree with today's subjective findings, physical examination, assessment and plan of care as described above and discussed extensively with the nurse practitioner.       Dry cough and afebrile no pleurisy.  Not much appetite.  Reports    Exam: NAD, anicteric sclera, supple neck, normal work of breathing, normal rate, abdomen nondistended, no cyanosis of lower extremities    Plan:    Change antibiotics to ceftriaxone and Flagyl.    I do not think you will benefit from drainage as infection is in his lung parenchyma with lung abscess.  This will benefit from prolonged antibiotic course approximately 1 to 2 months with follow-up imaging.    MRSA swab from nares.    Suspect post URI secondary bacterial pneumonia that resolved until lung abscess.  Watch for aspiration..    A total time of 50 minutes was spent on today's encounter.  Greater than 50% of the time was spent on the following:  Preparing for visit and chart review.  Obtaining and/or reviewing separately obtained history  Performing a medically appropriate exam and/or evaluation  Counseling and educating a patient/family/caregiver as noted above  Placing relevant orders  Referring and communicating with other professionals (not separately reported)  Independently interpreting results (not separately reported) and communicating results to the patient/family/caregiver  Care coordination (not separately reported) as noted above  Documenting clinical information in the electronic health records (e.g. problem list, visit note) on the day of the encounter      Impression/Plan     85 y.o. male with past

## 2025-06-24 LAB
ANION GAP SERPL CALC-SCNC: 5 MMOL/L (ref 2–12)
BASOPHILS # BLD: 0.1 K/UL (ref 0–0.1)
BASOPHILS NFR BLD: 1 % (ref 0–1)
BUN SERPL-MCNC: 15 MG/DL (ref 6–20)
BUN/CREAT SERPL: 14 (ref 12–20)
CALCIUM SERPL-MCNC: 7.8 MG/DL (ref 8.5–10.1)
CHLORIDE SERPL-SCNC: 108 MMOL/L (ref 97–108)
CO2 SERPL-SCNC: 25 MMOL/L (ref 21–32)
CREAT SERPL-MCNC: 1.07 MG/DL (ref 0.7–1.3)
DIFFERENTIAL METHOD BLD: ABNORMAL
EOSINOPHIL # BLD: 0.22 K/UL (ref 0–0.4)
EOSINOPHIL NFR BLD: 2.1 % (ref 0–7)
ERYTHROCYTE [DISTWIDTH] IN BLOOD BY AUTOMATED COUNT: 13.3 % (ref 11.5–14.5)
GLUCOSE BLD STRIP.AUTO-MCNC: 129 MG/DL (ref 65–117)
GLUCOSE BLD STRIP.AUTO-MCNC: 184 MG/DL (ref 65–117)
GLUCOSE BLD STRIP.AUTO-MCNC: 235 MG/DL (ref 65–117)
GLUCOSE BLD STRIP.AUTO-MCNC: 305 MG/DL (ref 65–117)
GLUCOSE SERPL-MCNC: 143 MG/DL (ref 65–100)
HCT VFR BLD AUTO: 27.9 % (ref 36.6–50.3)
HGB BLD-MCNC: 9.7 G/DL (ref 12.1–17)
IMM GRANULOCYTES # BLD AUTO: 0.06 K/UL (ref 0–0.04)
IMM GRANULOCYTES NFR BLD AUTO: 0.6 % (ref 0–0.5)
LYMPHOCYTES # BLD: 1.7 K/UL (ref 0.8–3.5)
LYMPHOCYTES NFR BLD: 16.2 % (ref 12–49)
M PNEUMO IGG SER IA-ACNC: 117 U/ML (ref 0–99)
M PNEUMO IGM SER IA-ACNC: <770 U/ML (ref 0–769)
MCH RBC QN AUTO: 31.8 PG (ref 26–34)
MCHC RBC AUTO-ENTMCNC: 34.8 G/DL (ref 30–36.5)
MCV RBC AUTO: 91.5 FL (ref 80–99)
MONOCYTES # BLD: 1.28 K/UL (ref 0–1)
MONOCYTES NFR BLD: 12.2 % (ref 5–13)
NEUTS SEG # BLD: 7.11 K/UL (ref 1.8–8)
NEUTS SEG NFR BLD: 67.9 % (ref 32–75)
NRBC # BLD: 0 K/UL (ref 0–0.01)
NRBC BLD-RTO: 0 PER 100 WBC
PLATELET # BLD AUTO: 292 K/UL (ref 150–400)
PMV BLD AUTO: 10.6 FL (ref 8.9–12.9)
POTASSIUM SERPL-SCNC: 3.3 MMOL/L (ref 3.5–5.1)
RBC # BLD AUTO: 3.05 M/UL (ref 4.1–5.7)
SERVICE CMNT-IMP: ABNORMAL
SODIUM SERPL-SCNC: 138 MMOL/L (ref 136–145)
WBC # BLD AUTO: 10.5 K/UL (ref 4.1–11.1)

## 2025-06-24 PROCEDURE — 2060000000 HC ICU INTERMEDIATE R&B

## 2025-06-24 PROCEDURE — 6370000000 HC RX 637 (ALT 250 FOR IP): Performed by: FAMILY MEDICINE

## 2025-06-24 PROCEDURE — APPNB30 APP NON BILLABLE TIME 0-30 MINS

## 2025-06-24 PROCEDURE — 6370000000 HC RX 637 (ALT 250 FOR IP)

## 2025-06-24 PROCEDURE — 6360000002 HC RX W HCPCS: Performed by: FAMILY MEDICINE

## 2025-06-24 PROCEDURE — 2580000003 HC RX 258: Performed by: FAMILY MEDICINE

## 2025-06-24 PROCEDURE — 6370000000 HC RX 637 (ALT 250 FOR IP): Performed by: NURSE PRACTITIONER

## 2025-06-24 PROCEDURE — 2580000003 HC RX 258: Performed by: INTERNAL MEDICINE

## 2025-06-24 PROCEDURE — 82962 GLUCOSE BLOOD TEST: CPT

## 2025-06-24 PROCEDURE — 99233 SBSQ HOSP IP/OBS HIGH 50: CPT | Performed by: INTERNAL MEDICINE

## 2025-06-24 PROCEDURE — 80048 BASIC METABOLIC PNL TOTAL CA: CPT

## 2025-06-24 PROCEDURE — G0545 PR INHERENT VISIT TO INPT: HCPCS | Performed by: INTERNAL MEDICINE

## 2025-06-24 PROCEDURE — 6370000000 HC RX 637 (ALT 250 FOR IP): Performed by: INTERNAL MEDICINE

## 2025-06-24 PROCEDURE — 2500000003 HC RX 250 WO HCPCS: Performed by: FAMILY MEDICINE

## 2025-06-24 PROCEDURE — 6360000002 HC RX W HCPCS: Performed by: INTERNAL MEDICINE

## 2025-06-24 PROCEDURE — 85025 COMPLETE CBC W/AUTO DIFF WBC: CPT

## 2025-06-24 RX ORDER — SODIUM CHLORIDE 9 MG/ML
100 INJECTION, SOLUTION INTRAVENOUS CONTINUOUS
Status: DISCONTINUED | OUTPATIENT
Start: 2025-06-24 | End: 2025-06-24 | Stop reason: ALTCHOICE

## 2025-06-24 RX ORDER — AMOXICILLIN 250 MG/1
250 CAPSULE ORAL ONCE
Status: COMPLETED | OUTPATIENT
Start: 2025-06-24 | End: 2025-06-24

## 2025-06-24 RX ORDER — DIPHENHYDRAMINE HYDROCHLORIDE 50 MG/ML
50 INJECTION, SOLUTION INTRAMUSCULAR; INTRAVENOUS
Status: DISCONTINUED | OUTPATIENT
Start: 2025-06-24 | End: 2025-06-25 | Stop reason: ALTCHOICE

## 2025-06-24 RX ORDER — ALBUTEROL SULFATE 90 UG/1
4 INHALANT RESPIRATORY (INHALATION) PRN
Status: DISCONTINUED | OUTPATIENT
Start: 2025-06-24 | End: 2025-06-24

## 2025-06-24 RX ORDER — ONDANSETRON 2 MG/ML
8 INJECTION INTRAMUSCULAR; INTRAVENOUS
Status: DISCONTINUED | OUTPATIENT
Start: 2025-06-24 | End: 2025-06-24

## 2025-06-24 RX ORDER — ACETAMINOPHEN 325 MG/1
650 TABLET ORAL
Status: DISCONTINUED | OUTPATIENT
Start: 2025-06-24 | End: 2025-06-24

## 2025-06-24 RX ORDER — HYDROCORTISONE SODIUM SUCCINATE 100 MG/2ML
100 INJECTION INTRAMUSCULAR; INTRAVENOUS
Status: DISCONTINUED | OUTPATIENT
Start: 2025-06-24 | End: 2025-06-24 | Stop reason: ALTCHOICE

## 2025-06-24 RX ORDER — EPINEPHRINE 1 MG/ML
0.3 INJECTION, SOLUTION, CONCENTRATE INTRAVENOUS PRN
Status: DISCONTINUED | OUTPATIENT
Start: 2025-06-24 | End: 2025-06-25 | Stop reason: ALTCHOICE

## 2025-06-24 RX ADMIN — ACETAMINOPHEN 650 MG: 325 TABLET ORAL at 11:11

## 2025-06-24 RX ADMIN — CARVEDILOL 25 MG: 12.5 TABLET, FILM COATED ORAL at 09:16

## 2025-06-24 RX ADMIN — GABAPENTIN 100 MG: 100 CAPSULE ORAL at 21:49

## 2025-06-24 RX ADMIN — INSULIN LISPRO 2 UNITS: 100 INJECTION, SOLUTION INTRAVENOUS; SUBCUTANEOUS at 13:09

## 2025-06-24 RX ADMIN — Medication 5 ML: at 07:18

## 2025-06-24 RX ADMIN — Medication 5 ML: at 11:09

## 2025-06-24 RX ADMIN — METRONIDAZOLE 500 MG: 250 TABLET ORAL at 06:16

## 2025-06-24 RX ADMIN — PIPERACILLIN AND TAZOBACTAM 3375 MG: 3; .375 INJECTION, POWDER, LYOPHILIZED, FOR SOLUTION INTRAVENOUS at 21:52

## 2025-06-24 RX ADMIN — SODIUM CHLORIDE, PRESERVATIVE FREE 10 ML: 5 INJECTION INTRAVENOUS at 21:50

## 2025-06-24 RX ADMIN — PIPERACILLIN AND TAZOBACTAM 4500 MG: 4; .5 INJECTION, POWDER, LYOPHILIZED, FOR SOLUTION INTRAVENOUS; PARENTERAL at 17:01

## 2025-06-24 RX ADMIN — ATORVASTATIN CALCIUM 10 MG: 10 TABLET, FILM COATED ORAL at 21:49

## 2025-06-24 RX ADMIN — SODIUM CHLORIDE: 0.9 INJECTION, SOLUTION INTRAVENOUS at 03:03

## 2025-06-24 RX ADMIN — GABAPENTIN 100 MG: 100 CAPSULE ORAL at 09:17

## 2025-06-24 RX ADMIN — Medication 5 ML: at 02:54

## 2025-06-24 RX ADMIN — HYDRALAZINE HYDROCHLORIDE 10 MG: 20 INJECTION INTRAMUSCULAR; INTRAVENOUS at 07:41

## 2025-06-24 RX ADMIN — GUAIFENESIN 600 MG: 600 TABLET, EXTENDED RELEASE ORAL at 09:16

## 2025-06-24 RX ADMIN — METRONIDAZOLE 500 MG: 250 TABLET ORAL at 14:34

## 2025-06-24 RX ADMIN — CARVEDILOL 25 MG: 12.5 TABLET, FILM COATED ORAL at 16:57

## 2025-06-24 RX ADMIN — SODIUM CHLORIDE, PRESERVATIVE FREE 10 ML: 5 INJECTION INTRAVENOUS at 09:18

## 2025-06-24 RX ADMIN — POTASSIUM CHLORIDE 40 MEQ: 750 TABLET, FILM COATED, EXTENDED RELEASE ORAL at 02:54

## 2025-06-24 RX ADMIN — Medication 5 ML: at 22:34

## 2025-06-24 RX ADMIN — Medication 5 ML: at 18:33

## 2025-06-24 RX ADMIN — FLUOXETINE HYDROCHLORIDE 40 MG: 20 CAPSULE ORAL at 09:16

## 2025-06-24 RX ADMIN — LEVETIRACETAM 250 MG: 500 TABLET, FILM COATED ORAL at 09:17

## 2025-06-24 RX ADMIN — INSULIN LISPRO 6 UNITS: 100 INJECTION, SOLUTION INTRAVENOUS; SUBCUTANEOUS at 16:57

## 2025-06-24 RX ADMIN — LEVOTHYROXINE SODIUM 100 MCG: 0.1 TABLET ORAL at 09:17

## 2025-06-24 RX ADMIN — GUAIFENESIN 600 MG: 600 TABLET, EXTENDED RELEASE ORAL at 21:49

## 2025-06-24 RX ADMIN — INSULIN LISPRO 2 UNITS: 100 INJECTION, SOLUTION INTRAVENOUS; SUBCUTANEOUS at 09:16

## 2025-06-24 RX ADMIN — AMOXICILLIN 250 MG: 250 CAPSULE ORAL at 14:33

## 2025-06-24 RX ADMIN — LEVETIRACETAM 250 MG: 500 TABLET, FILM COATED ORAL at 21:49

## 2025-06-24 ASSESSMENT — PAIN SCALES - GENERAL
PAINLEVEL_OUTOF10: 3
PAINLEVEL_OUTOF10: 0
PAINLEVEL_OUTOF10: 0

## 2025-06-24 ASSESSMENT — PAIN DESCRIPTION - DESCRIPTORS: DESCRIPTORS: ACHING

## 2025-06-24 ASSESSMENT — PAIN DESCRIPTION - LOCATION: LOCATION: HEAD

## 2025-06-24 ASSESSMENT — PAIN DESCRIPTION - ORIENTATION: ORIENTATION: MID

## 2025-06-24 NOTE — PROGRESS NOTES
Infectious Disease Services Note    Notified by pharmacist that he tolerated challenge with po amoxicillin. Stopped ceftriaxone and flagyl, started Zosyn.             Micki Love APRN-NP

## 2025-06-24 NOTE — PROGRESS NOTES
Spiritual Health History and Assessment/Progress Note  [facility]    Loneliness/Social Isolation,  ,  ,      Name: Harman Streeter MRN: [id number]    Age: 85 y.o.     Sex: male   Language: English   Alevism: Muslim   Empyema (HCC)     Date: 6/24/2025            Total Time Calculated: 15 min              Spiritual Assessment began in [facility] IM        Referral/Consult From: Palliative Care   Encounter Overview/Reason: Loneliness/Social Isolation  Service Provided For: Patient not available    Nicole, Belief, Meaning:   Patient unable to assess at this time  Family/Friends No family/friends present      Importance and Influence:  Patient unable to assess at this time  Family/Friends No family/friends present    Community:  Patient   Family/Friends No family/friends present    Assessment and Plan of Care:     Patient Interventions include:   Family/Friends Interventions include: No family/friends present    Patient Plan of Care: Spiritual Care available upon further referral  Family/Friends Plan of Care: No family/friends present    I attempted to visit Harman Streeter for a palliative initial spiritual health assessment.     The patient appeared to be asleep and did not alert. No family was present.     Electronically signed by MICKY SCOTT on 6/24/2025 at 2:40 PM

## 2025-06-24 NOTE — PLAN OF CARE
Problem: Chronic Conditions and Co-morbidities  Goal: Patient's chronic conditions and co-morbidity symptoms are monitored and maintained or improved  6/23/2025 2048 by Amara Hodge RN  Outcome: Progressing  6/23/2025 1253 by Amara Hodge RN  Outcome: Progressing     Problem: Discharge Planning  Goal: Discharge to home or other facility with appropriate resources  6/23/2025 2048 by Amara Hodge RN  Outcome: Progressing  6/23/2025 1253 by Amara Hodge RN  Outcome: Progressing     Problem: Safety - Adult  Goal: Free from fall injury  6/23/2025 2048 by Amara Hodge RN  Outcome: Progressing  6/23/2025 1253 by Amara Hodge RN  Outcome: Progressing

## 2025-06-24 NOTE — PROGRESS NOTES
Infectious Disease Progress Note    INFECTIOUS DISEASE Attending:     I agree with the above infectious disease daily progress note in its entirety as authored by and discussed in detail with the nurse practitioner.   I have reviewed pertinent laboratory studies, microbiology cultures, radiologic reports with review of the consultations and progress notes as appropriate.   I agree with today's subjective findings, physical examination, assessment and plan of care as described above and discussed extensively with the nurse practitioner.       Feels better, no diarrhea and afebrile.    Exam: NAD, supple neck, anicteric sclerae, no increased WOB, CTA b/l, normal heart rate, moving all extremities.    Plan:    Amoxicillin challenge - if tolerated in one hour, then change antibiotics to Zosyn and remove PCN from list of allergies.    When ready for eventual discharge, will need PICC line to complete 1-2 months of antibiotic therapy at a minimum.    Will need follow up CT chest to monitor abscess until resolution.    A total time of 30 minutes was spent on today's encounter.  Greater than 50% of the time was spent on the following:  Preparing for visit and chart review.  Obtaining and/or reviewing separately obtained history  Performing a medically appropriate exam and/or evaluation  Counseling and educating a patient/family/caregiver as noted above  Placing relevant orders  Referring and communicating with other professionals (not separately reported)  Independently interpreting results (not separately reported) and communicating results to the patient/family/caregiver  Care coordination (not separately reported) as noted above  Documenting clinical information in the electronic health records (e.g. problem list, visit note) on the day of the encounter      Impression/Plan     85 y.o. male with past medical Hx of atrial fibrillation, diabetes, heart failure who presented to the ED at [facility] with complaints of SOB, cough,

## 2025-06-24 NOTE — PROGRESS NOTES
Hospitalist Progress Note  Miller Maciel MD  Answering service: [phone]        Date of Service:  2025  NAME:  Harman Streeter  :  1940  MRN:  [id number]      Admission Summary:     Patient transferred from [facility] with empyema.    Interval history / Subjective:     Cough is improving after addition of Mucinex.     Assessment & Plan:     Empyema  - Patient with recent admission at [facility] for pneumonia presented with persistent symptoms of chest pain, shortness of breath and cough  - CTA of the chest shows a fluid hyperdense level with loculated right pleural effusion concerning for empyema or bronchopleural fistula  - Vancomycin and cefepime discontinued and started ceftriaxone and Flagyl  -Per thoracic surgery, recommended nonoperative management with IV antibiotic  - ID and thoracic surgery following     OSMAN  - Probably prerenal, rule out ATN from infection  - OSMAN now resolved  -Discontinue IV fluids     Leukocytosis  - Patient with WBC count of over 12,000 on presentation, otherwise no accompanying features of sepsis  - This is likely due to an underlying empyema  - Leukocytosis resolved     Thrombocytosis  - This is likely reactive due to infectious process  - Resolved     Hypertension  - Continue Coreg  - Monitor blood pressure     Diabetes type 2  - Hold Januvia  - Continue insulin sliding coverage, monitor blood sugars     Dyslipidemia  - Continue statin     Hypothyroidism  - Continue Synthroid     Seizure disorder  - Continue Keppra     Mood disorder  - Continue Prozac     Code status: Full  Prophylaxis: Lovenox  Care Plan discussed with: Patient and care team  Anticipated Disposition: More than 48 hours  Central Line:   None    CRITICAL CARE ATTESTATION:  I had a face to face encounter with the patient, reviewed and interpreted patient data including clinical events,  normal limit, Strength 5/5 in all extremities, DTR 1+ x 4  Skin: warm            Data Review:    Review and/or order of clinical lab test    I have independently reviewed and interpreted patient's lab and all other diagnostic data    Notes reviewed from all clinical/nonclinical/nursing services involved in patient's clinical care. Care coordination discussions were held with appropriate clinical/nonclinical/ nursing providers based on care coordination needs.     Labs:     Recent Labs     06/23/25  0205 06/24/25  0100   WBC 11.8* 10.5   HGB 9.8* 9.7*   HCT 28.7* 27.9*    292     Recent Labs     06/22/25  0841 06/23/25  0205 06/24/25  0100    141 138   K 3.8 2.9* 3.3*    106 108   CO2 28 27 25   BUN 21* 18 15   MG 1.8  --   --      Recent Labs     06/22/25  0841   ALT 15   GLOB 4.9*     No results for input(s): \"INR\", \"APTT\" in the last 72 hours.    Invalid input(s): \"PTP\"   No results for input(s): \"TIBC\" in the last 72 hours.    Invalid input(s): \"FE\", \"PSAT\", \"FERR\"   No results found for: \"RBCF\"   No results for input(s): \"PH\", \"PCO2\", \"PO2\" in the last 72 hours.  No results for input(s): \"CPK\" in the last 72 hours.    Invalid input(s): \"CPKMB\", \"CKNDX\", \"TROIQ\"  No results found for: \"CHOL\", \"CHLST\", \"CHOLV\", \"HDL\", \"HDLC\", \"LDL\", \"LDLC\"  No results found for: \"GLUCPOC\"  [unfilled]      Medications Reviewed:     Current Facility-Administered Medications   Medication Dose Route Frequency    hydrALAZINE (APRESOLINE) injection 10 mg  10 mg IntraVENous Q6H PRN    glucose chewable tablet 16 g  4 tablet Oral PRN    glucagon injection 1 mg  1 mg SubCUTAneous PRN    dextrose 10 % infusion   IntraVENous Continuous PRN    dextrose bolus 10% 125 mL  125 mL IntraVENous PRN    Or    dextrose bolus 10% 250 mL  250 mL IntraVENous PRN    metroNIDAZOLE (FLAGYL) tablet 500 mg  500 mg Oral 3 times per day    carvedilol (COREG) tablet 25 mg  25 mg Oral BID WC    cefTRIAXone (ROCEPHIN) 2,000 mg in sterile water 20 mL

## 2025-06-24 NOTE — CARE COORDINATION
Transition of Care Plan:    RUR: 12%  Prior Level of Functioning:independent   Disposition: probable home with home IV antibiotics. ID is following.  YA: 6/26/25  If SNF or IPR: Date FOC offered: N/A  Date FOC received:   Accepting facility:   Date authorization started with reference number:   Date authorization received and expires:   Follow up appointments:   DME needed: None  Transportation at discharge: family  IM/[facility] Medicare/ letter given: 1st 6/23/25  Is patient a [place] and connected with [facility]?    If yes, was  transfer form completed and VA notified?   Caregiver Contact: daughter Pamela Pereyra  Discharge Caregiver contacted prior to discharge?   Care Conference needed?   Barriers to discharge:     CM reviewed case with treatment team during IMCU Interdisciplinary Rounds. Patient continues with treatment for empyema.  Probable plan is for discharge with home IV antibiotics.

## 2025-06-24 NOTE — PROGRESS NOTES
Hospitalist Night Cover     Name: Harman Streeter  YOB: 1940      Overnight update:        Notified of cough not getting relieved by PRN cough medication. Requesting for another medication prior to scheduled PRN dose  VS: /94, HR 86, RR 21, O2 sat 96% on RA    - Mucinex BID x 5 days added to PRN      SABRINA Bernal

## 2025-06-25 LAB
ANION GAP SERPL CALC-SCNC: 7 MMOL/L (ref 2–12)
BACTERIA SPEC CULT: NORMAL
BACTERIA SPEC CULT: NORMAL
BASOPHILS # BLD: 0.11 K/UL (ref 0–0.1)
BASOPHILS NFR BLD: 1.3 % (ref 0–1)
BUN SERPL-MCNC: 12 MG/DL (ref 6–20)
BUN/CREAT SERPL: 13 (ref 12–20)
CALCIUM SERPL-MCNC: 8.2 MG/DL (ref 8.5–10.1)
CHLORIDE SERPL-SCNC: 112 MMOL/L (ref 97–108)
CO2 SERPL-SCNC: 22 MMOL/L (ref 21–32)
CREAT SERPL-MCNC: 0.9 MG/DL (ref 0.7–1.3)
DIFFERENTIAL METHOD BLD: ABNORMAL
EOSINOPHIL # BLD: 0.28 K/UL (ref 0–0.4)
EOSINOPHIL NFR BLD: 3.2 % (ref 0–7)
ERYTHROCYTE [DISTWIDTH] IN BLOOD BY AUTOMATED COUNT: 13.7 % (ref 11.5–14.5)
GLUCOSE BLD STRIP.AUTO-MCNC: 181 MG/DL (ref 65–117)
GLUCOSE BLD STRIP.AUTO-MCNC: 185 MG/DL (ref 65–117)
GLUCOSE BLD STRIP.AUTO-MCNC: 211 MG/DL (ref 65–117)
GLUCOSE BLD STRIP.AUTO-MCNC: 265 MG/DL (ref 65–117)
GLUCOSE SERPL-MCNC: 171 MG/DL (ref 65–100)
HCT VFR BLD AUTO: 32.4 % (ref 36.6–50.3)
HGB BLD-MCNC: 10.4 G/DL (ref 12.1–17)
IMM GRANULOCYTES # BLD AUTO: 0.08 K/UL (ref 0–0.04)
IMM GRANULOCYTES NFR BLD AUTO: 0.9 % (ref 0–0.5)
L PNEUMO1 AG UR QL IA: NEGATIVE
LYMPHOCYTES # BLD: 1.36 K/UL (ref 0.8–3.5)
LYMPHOCYTES NFR BLD: 15.5 % (ref 12–49)
MCH RBC QN AUTO: 31.4 PG (ref 26–34)
MCHC RBC AUTO-ENTMCNC: 32.1 G/DL (ref 30–36.5)
MCV RBC AUTO: 97.9 FL (ref 80–99)
MONOCYTES # BLD: 1.04 K/UL (ref 0–1)
MONOCYTES NFR BLD: 11.8 % (ref 5–13)
NEUTS SEG # BLD: 5.92 K/UL (ref 1.8–8)
NEUTS SEG NFR BLD: 67.3 % (ref 32–75)
NRBC # BLD: 0 K/UL (ref 0–0.01)
NRBC BLD-RTO: 0 PER 100 WBC
PLATELET # BLD AUTO: 294 K/UL (ref 150–400)
PMV BLD AUTO: 10.5 FL (ref 8.9–12.9)
POTASSIUM SERPL-SCNC: 3.7 MMOL/L (ref 3.5–5.1)
RBC # BLD AUTO: 3.31 M/UL (ref 4.1–5.7)
SERVICE CMNT-IMP: ABNORMAL
SERVICE CMNT-IMP: NORMAL
SODIUM SERPL-SCNC: 141 MMOL/L (ref 136–145)
SPECIMEN SOURCE: NORMAL
WBC # BLD AUTO: 8.8 K/UL (ref 4.1–11.1)

## 2025-06-25 PROCEDURE — 2500000003 HC RX 250 WO HCPCS: Performed by: FAMILY MEDICINE

## 2025-06-25 PROCEDURE — 2060000000 HC ICU INTERMEDIATE R&B

## 2025-06-25 PROCEDURE — APPNB30 APP NON BILLABLE TIME 0-30 MINS

## 2025-06-25 PROCEDURE — 6360000002 HC RX W HCPCS: Performed by: FAMILY MEDICINE

## 2025-06-25 PROCEDURE — 6370000000 HC RX 637 (ALT 250 FOR IP): Performed by: NURSE PRACTITIONER

## 2025-06-25 PROCEDURE — 6370000000 HC RX 637 (ALT 250 FOR IP)

## 2025-06-25 PROCEDURE — 2580000003 HC RX 258: Performed by: INTERNAL MEDICINE

## 2025-06-25 PROCEDURE — G0545 PR INHERENT VISIT TO INPT: HCPCS | Performed by: INTERNAL MEDICINE

## 2025-06-25 PROCEDURE — 99232 SBSQ HOSP IP/OBS MODERATE 35: CPT | Performed by: INTERNAL MEDICINE

## 2025-06-25 PROCEDURE — 85025 COMPLETE CBC W/AUTO DIFF WBC: CPT

## 2025-06-25 PROCEDURE — 6360000002 HC RX W HCPCS: Performed by: INTERNAL MEDICINE

## 2025-06-25 PROCEDURE — 80048 BASIC METABOLIC PNL TOTAL CA: CPT

## 2025-06-25 PROCEDURE — 82962 GLUCOSE BLOOD TEST: CPT

## 2025-06-25 PROCEDURE — 97530 THERAPEUTIC ACTIVITIES: CPT

## 2025-06-25 PROCEDURE — 6370000000 HC RX 637 (ALT 250 FOR IP): Performed by: FAMILY MEDICINE

## 2025-06-25 PROCEDURE — 97161 PT EVAL LOW COMPLEX 20 MIN: CPT

## 2025-06-25 RX ADMIN — INSULIN LISPRO 4 UNITS: 100 INJECTION, SOLUTION INTRAVENOUS; SUBCUTANEOUS at 17:14

## 2025-06-25 RX ADMIN — INSULIN LISPRO 2 UNITS: 100 INJECTION, SOLUTION INTRAVENOUS; SUBCUTANEOUS at 09:38

## 2025-06-25 RX ADMIN — Medication 5 ML: at 21:28

## 2025-06-25 RX ADMIN — FLUOXETINE HYDROCHLORIDE 40 MG: 20 CAPSULE ORAL at 09:38

## 2025-06-25 RX ADMIN — Medication 5 ML: at 11:49

## 2025-06-25 RX ADMIN — SODIUM CHLORIDE, PRESERVATIVE FREE 10 ML: 5 INJECTION INTRAVENOUS at 03:27

## 2025-06-25 RX ADMIN — HYDRALAZINE HYDROCHLORIDE 10 MG: 20 INJECTION INTRAMUSCULAR; INTRAVENOUS at 03:27

## 2025-06-25 RX ADMIN — LEVETIRACETAM 250 MG: 500 TABLET, FILM COATED ORAL at 09:38

## 2025-06-25 RX ADMIN — GUAIFENESIN 600 MG: 600 TABLET, EXTENDED RELEASE ORAL at 20:59

## 2025-06-25 RX ADMIN — LEVETIRACETAM 250 MG: 500 TABLET, FILM COATED ORAL at 20:58

## 2025-06-25 RX ADMIN — GUAIFENESIN 600 MG: 600 TABLET, EXTENDED RELEASE ORAL at 09:38

## 2025-06-25 RX ADMIN — SODIUM CHLORIDE, PRESERVATIVE FREE 10 ML: 5 INJECTION INTRAVENOUS at 21:00

## 2025-06-25 RX ADMIN — GABAPENTIN 100 MG: 100 CAPSULE ORAL at 20:58

## 2025-06-25 RX ADMIN — INSULIN LISPRO 2 UNITS: 100 INJECTION, SOLUTION INTRAVENOUS; SUBCUTANEOUS at 13:07

## 2025-06-25 RX ADMIN — PIPERACILLIN AND TAZOBACTAM 3375 MG: 3; .375 INJECTION, POWDER, LYOPHILIZED, FOR SOLUTION INTRAVENOUS at 21:58

## 2025-06-25 RX ADMIN — PIPERACILLIN AND TAZOBACTAM 3375 MG: 3; .375 INJECTION, POWDER, LYOPHILIZED, FOR SOLUTION INTRAVENOUS at 06:50

## 2025-06-25 RX ADMIN — ATORVASTATIN CALCIUM 10 MG: 10 TABLET, FILM COATED ORAL at 20:58

## 2025-06-25 RX ADMIN — LEVOTHYROXINE SODIUM 100 MCG: 0.1 TABLET ORAL at 09:38

## 2025-06-25 RX ADMIN — Medication 5 ML: at 03:16

## 2025-06-25 RX ADMIN — Medication 5 ML: at 07:30

## 2025-06-25 RX ADMIN — ACETAMINOPHEN 650 MG: 325 TABLET ORAL at 06:36

## 2025-06-25 RX ADMIN — PIPERACILLIN AND TAZOBACTAM 3375 MG: 3; .375 INJECTION, POWDER, LYOPHILIZED, FOR SOLUTION INTRAVENOUS at 14:59

## 2025-06-25 RX ADMIN — CARVEDILOL 25 MG: 12.5 TABLET, FILM COATED ORAL at 17:13

## 2025-06-25 RX ADMIN — CARVEDILOL 25 MG: 12.5 TABLET, FILM COATED ORAL at 09:38

## 2025-06-25 RX ADMIN — Medication 5 ML: at 17:13

## 2025-06-25 RX ADMIN — INSULIN LISPRO 2 UNITS: 100 INJECTION, SOLUTION INTRAVENOUS; SUBCUTANEOUS at 22:12

## 2025-06-25 RX ADMIN — GABAPENTIN 100 MG: 100 CAPSULE ORAL at 09:38

## 2025-06-25 RX ADMIN — SODIUM CHLORIDE, PRESERVATIVE FREE 10 ML: 5 INJECTION INTRAVENOUS at 09:41

## 2025-06-25 ASSESSMENT — PAIN SCALES - GENERAL
PAINLEVEL_OUTOF10: 0
PAINLEVEL_OUTOF10: 0
PAINLEVEL_OUTOF10: 8

## 2025-06-25 ASSESSMENT — PAIN DESCRIPTION - DESCRIPTORS: DESCRIPTORS: ACHING

## 2025-06-25 ASSESSMENT — PAIN DESCRIPTION - LOCATION: LOCATION: HEAD

## 2025-06-25 NOTE — CARE COORDINATION
Transition of Care Plan:     RUR: 12%  Prior Level of Functioning:independent   Disposition: probable home with home IV antibiotics versus TBD.  ID is following.  PT evaluation has been ordered.  YA: 6/26/25  If SNF or IPR: Date FOC offered: N/A  Date FOC received:   Accepting facility:   Date authorization started with reference number:   Date authorization received and expires:   Follow up appointments:   DME needed: None  Transportation at discharge: family  IM/[facility] Medicare/ letter given: 1st 6/23/25  Is patient a  and connected with VA?               If yes, was  transfer form completed and VA notified?   Caregiver Contact: daughter Pamela Pereyra  Discharge Caregiver contacted prior to discharge?   Care Conference needed?   Barriers to discharge:

## 2025-06-25 NOTE — PROGRESS NOTES
Hospitalist Progress Note  Miller Maciel MD  Answering service: [phone]        Date of Service:  2025  NAME:  Harman Streeter  :  1940  MRN:  [id number]      Admission Summary:     Patient transferred from [facility] with empyema.    Interval history / Subjective:     Cough is improving after addition of Mucinex.  Denies any shortness of breath or chest pain.     Assessment & Plan:     Lung abscess  - Patient with recent admission at [facility] for pneumonia presented with persistent symptoms of chest pain, shortness of breath and cough  - CTA of the chest shows a fluid hyperdense level with loculated right pleural effusion, initially was concern for empyema however after radiology reviewed with thoracic surgery, more likely lung abscess  - On Zosyn, patient will need at least 1 to 2 months of IV antibiotics per ID  -Per thoracic surgery, recommended nonoperative management with IV antibiotic  - ID and thoracic surgery following     OSMAN  - Probably prerenal, rule out ATN from infection  - OSMAN now resolved  -Discontinued IV fluids     Leukocytosis  - Patient with WBC count of over 12,000 on presentation, otherwise no accompanying features of sepsis  - This is likely due to an underlying empyema  - Leukocytosis resolved     Thrombocytosis  - This is likely reactive due to infectious process  - Resolved     Hypertension  - Continue Coreg  - Monitor blood pressure     Diabetes type 2  - Hold Januvia  - Continue insulin sliding coverage, monitor blood sugars     Dyslipidemia  - Continue statin     Hypothyroidism  - Continue Synthroid     Seizure disorder  - Continue Keppra     Mood disorder  - Continue Prozac     Code status: Full  Prophylaxis: Lovenox  Care Plan discussed with: Patient and care team  Anticipated Disposition: More than 48 hours, PT ordered.  Central Line:    Physical Therapy     Referred by: Chin Hill MD; Medical Diagnosis (from order):    Diagnosis Information      Diagnosis    726.5 (ICD-9-CM) - M70.61, M70.62 (ICD-10-CM) - Greater trochanteric bursitis of both hips                Daily Treatment Note    Visit:  8     SUBJECTIVE                                                                                                               Patient reported some soreness in left knee after last session. She needed to rest the next 5 days. She also used icy hot and heat. Pain was rated 2/10. Pain is medial left knee and hamstrings.     Patient reports she didn't sleep well last night and her left eye is bothering her. patient denies any current hip pain.     Patient reports difficulty stepping in and out of the tub shower.   Pain / Symptoms:  Pain rating (out of 10): Current: 0     OBJECTIVE                                                                                                                        TREATMENT                                                                                                                  Therapeutic Exercise:  Recumbent bike seat 6 level 1 x 5 minutes total half revolutions   Seated Hamstring stretch 2 x 30 sec bilateral lower extremity  Cues needed for form   Seated calf stretch with strap 2 x 30 seconds bilateral   Standing hamstring curls x 10 bilateral with upper extremity support                Manual Therapy:        Therapeutic Activity:  Step overs to simulate getting in and out of the tub shower x 10 with upper extremity support   Sit to stands x 10   Seated march x 10 bilateral to assist with leg lift into transportation van, discussed transfer during   6 inch step 2 x 10 each    Forward step ups    Lateral step ups     Skilled input: verbal instruction/cues, tactile instruction/cues, posture correction and as detailed above    Writer verbally educated and received verbal consent for hand placement, positioning of  patient, and techniques to be performed today from patient for clothing adjustments for techniques, therapist position for techniques and hand placement and palpation for techniques as described above and how they are pertinent to the patient's plan of care.    Home Exercise Program/Education Materials: *above indicates provided as part of home exercise program     Bilateral straight leg raise x 10 each *  *bilateral hip abduction side lying with level 1 theraband x 10 *  bilateral side lying clam  With level 1 theraband x 10 *   hook lying hip abduction with level 1 theraband x 10*  Bridging x 10 with level 1 theraband *  *Right Humbreto stretch x 30 seconds with towel to hold left leg   Supine *Right piriformis stretch x 30 seconds  Standing iliotibial band  Stretch 30 sec x 3 *    Access Code: [email or web address]  URL: [email or web address]  Date: 02/11/2022  Prepared by: Jillian Seo    Exercises  · Supine Straight Leg Raises - 2 x daily - 7 x weekly - 2 sets - 10 reps  · Supine Bridge with Resistance Band - 2 x daily - 7 x weekly - 2 sets - 10 reps  · Sidelying Hip Abduction with Resistance at Thighs - 2 x daily - 7 x weekly - 2 sets - 10 reps  · Clam - 2 x daily - 7 x weekly - 2 sets - 10 reps  · Standing ITB Stretch - 2 x daily - 7 x weekly - 2 sets - 10 reps     ASSESSMENT                                                                                                             Patient needed 3 rest breaks today. Some fatigue with exercise. Some right lateral hip pain at the end of the session. patient reports some left knee pain due to arthritis. Overall good tolerance to session and decreased manual therapy today.   Pain/symptoms after session (out of 10): 2  Patient Education:   Results of above outlined education: Verbalizes understanding, Demonstrates understanding and Needs reinforcement      PLAN                                                                                                                            Suggestions for next session as indicated: Progress per plan of care  Bike   Continue Tub and car transfer  Standing tolerance   lower extremity strengthening   Manual hip mobilizations as needed          Therapy procedure time and total treatment time can be found documented on the Time Entry flowsheet

## 2025-06-25 NOTE — PLAN OF CARE
INFECTIOUS DISEASE discharge plan:    Diagnosis: Empyema    Antibiotic: Zosyn IV 3.375 g Q 8 hrs or administered continuous via infusion pump through 8/16/25, inclusive    PICC line insertion for outpatient antibiotic.     Routine PICC/Rachael/ Portacath Care including PRN catheter flow management    Weekly labs with results fax to # [phone]. Call critical lab values to [phone].   [x] CBC w/diff  [x] BUN/Creatinine  [x] AST, ALT  [] CPK  [] CRP      Do NOT pull PICC line after last dose. Call Infectious Disease @ [phone]    May send to IR for line evaluation or replacement PRN Phone # [phone]    Follow up with Infectious Disease. Repeat CT chest with contrast in 4 weeks after 7/20/25

## 2025-06-25 NOTE — PROGRESS NOTES
Infectious Disease Progress Note    INFECTIOUS DISEASE Attending:     I agree with the above infectious disease daily progress note in its entirety as authored by and discussed in detail with the nurse practitioner.   I have reviewed pertinent laboratory studies, microbiology cultures, radiologic reports with review of the consultations and progress notes as appropriate.   I agree with today's subjective findings, physical examination, assessment and plan of care as described above and discussed extensively with the nurse practitioner.       Tolerated amoxicillin without issue and Zosyn so far as well.  Residual dry cough.  Tolerating p.o. diet.    Exam: NAD, supple neck, anicteric sclera, no increased work of breathing, normal heart rate, moving all extremities    Plan:    See plan of care note.    Plan to complete 8 weeks of Zosyn outpatient.    Repeat CT chest in 4 weeks to determine duration of antibiotic therapy.    Note: Patient is not allergic to penicillin.    Will sign off, please call with questions.    A total time of 20 minutes was spent on today's encounter.  Greater than 50% of the time was spent on the following:  Preparing for visit and chart review.  Obtaining and/or reviewing separately obtained history  Performing a medically appropriate exam and/or evaluation  Counseling and educating a patient/family/caregiver as noted above  Placing relevant orders  Referring and communicating with other professionals (not separately reported)  Independently interpreting results (not separately reported) and communicating results to the patient/family/caregiver  Care coordination (not separately reported) as noted above  Documenting clinical information in the electronic health records (e.g. problem list, visit note) on the day of the encounter      Impression/Plan     85 y.o. male with past medical Hx of atrial fibrillation, diabetes, heart failure who presented to the ED at [facility] with complaints of SOB, cough,

## 2025-06-25 NOTE — PROGRESS NOTES
POST FALL MANAGEMENT    Harman Streeter  MEDICAL RECORD NUMBER:  [id number]  AGE: 85 y.o.   GENDER: male  : 1940  TODAYS DATE:  2025    Details     Fall Occurred: Yes    Was the Fall Witnessed:  Yes By De Guzman PCT      Brief Review of Event:This RN in bathroom responding to [facility] Gab pressing bathroom emergency call bell. Pt observed on his bottom on the floor in front of the toilet, facing the toilet. Per Gab, pt was on the toilet for a bowel movement with pull-up custodial off. While pt was having the BM, she states she turned to the sink to look for a fresh pull-up and turned back to the patient to see him falling on his side. Pt cleaned and assisted back to bed.         Who found the patient: Witnessed by De Guzman PCT      Where was the patient at the time of the fall: Bathroom, on the toilet      Patient Comments: I was taking my brief off and just fell head over heels. I fell on my side and my butt, I didn't hit my head.         Date Fall Occurred:  2025 .       Time Fall Occurred: 11:40a.m.     Assessment     Post Fall Head to Toe Assessment Completed: Yes    Post Fall Predictive Analytic Score Reviewed: Yes     Post Fall Vitals Completed: Yes    Post Fall Neuro Checks Completed: Yes    Injury Occurred(if yes, describe injury):  no, pt states no pain, no injury observed by this RN           Did the Patient Experience:(Check Jose all that apply)    [] Patient hit head  [] Loss of consciousness  [] Change in mental status following the fall  [] Patient is on an anticoagulant medication      CT Performed:  no    Follow-up     Persons Notified of Fall:  (Provide names of persons notified)   [x] Physician: Dr Maciel  [] GALLITO:  [x] Nursing Supervisior: Yanely  [x] Manager: Gayle Otero  [] Pharmacist:  [] Family:  [] Other:      Electronically signed by Barbara Wiseman RN 2025 at 12:29 PM

## 2025-06-25 NOTE — PLAN OF CARE
Problem: Physical Therapy - Adult  Goal: By Discharge: Performs mobility at highest level of function for planned discharge setting.  See evaluation for individualized goals.  Description: FUNCTIONAL STATUS PRIOR TO ADMISSION: Patient was independent and active without use of DME.  Recent SNF stay x2 weeks prior to this admission, but reports he had progressed back to his functional baseline    HOME SUPPORT PRIOR TO ADMISSION: The patient lived alone with family living in area available to provide intermittent assistance.    Physical Therapy Goals  Initiated 6/25/2025  1.  Patient will move from supine to sit and sit to supine in bed with independence within 7 day(s).    2.  Patient will perform sit to stand with independence within 7 day(s).  3.  Patient will transfer from bed to chair and chair to bed with independence using the least restrictive device within 7 day(s).  4.  Patient will ambulate with independence for 200 feet with the least restrictive device within 7 day(s).   5.  Patient will ascend/descend 3 stairs with B handrail(s) with supervision/set-up within 7 day(s).   Outcome: Progressing   PHYSICAL THERAPY EVALUATION    Patient: Harman Streeter (85 y.o. male)  Date: 6/25/2025  Primary Diagnosis: Empyema (HCC) [J86.9]       Precautions: Restrictions/Precautions  Restrictions/Precautions: Fall Risk            ASSESSMENT :   Patient is an 86 y/o male presenting as a transfer from [facility] for medical management of empyema.  Patient also with mechanical GLF while in hospital (this AM) while tolieting with  nursing staff present with no obvious injuries.  Hospitalist cleared patient to resume mobility post-fall.  Patient seen for PT evaluation with good tolerance to PT session.  Currently appears to present at a mild decline from his functional baseline, requiring overall S-CGA for all mobility and presents with deficits in generalized weakness and deconditioning.  Patient will benefit from ongoing acute PT

## 2025-06-26 LAB
ANION GAP SERPL CALC-SCNC: 6 MMOL/L (ref 2–12)
BASOPHILS # BLD: 0.11 K/UL (ref 0–0.1)
BASOPHILS NFR BLD: 1.4 % (ref 0–1)
BUN SERPL-MCNC: 10 MG/DL (ref 6–20)
BUN/CREAT SERPL: 11 (ref 12–20)
CALCIUM SERPL-MCNC: 8.1 MG/DL (ref 8.5–10.1)
CHLORIDE SERPL-SCNC: 112 MMOL/L (ref 97–108)
CO2 SERPL-SCNC: 23 MMOL/L (ref 21–32)
CREAT SERPL-MCNC: 0.88 MG/DL (ref 0.7–1.3)
DIFFERENTIAL METHOD BLD: ABNORMAL
EOSINOPHIL # BLD: 0.34 K/UL (ref 0–0.4)
EOSINOPHIL NFR BLD: 4.2 % (ref 0–7)
ERYTHROCYTE [DISTWIDTH] IN BLOOD BY AUTOMATED COUNT: 13.8 % (ref 11.5–14.5)
GLUCOSE BLD STRIP.AUTO-MCNC: 140 MG/DL (ref 65–117)
GLUCOSE BLD STRIP.AUTO-MCNC: 268 MG/DL (ref 65–117)
GLUCOSE BLD STRIP.AUTO-MCNC: 277 MG/DL (ref 65–117)
GLUCOSE BLD STRIP.AUTO-MCNC: 283 MG/DL (ref 65–117)
GLUCOSE SERPL-MCNC: 138 MG/DL (ref 65–100)
HCT VFR BLD AUTO: 28.8 % (ref 36.6–50.3)
HGB BLD-MCNC: 9.6 G/DL (ref 12.1–17)
IMM GRANULOCYTES # BLD AUTO: 0.04 K/UL (ref 0–0.04)
IMM GRANULOCYTES NFR BLD AUTO: 0.5 % (ref 0–0.5)
LYMPHOCYTES # BLD: 1.79 K/UL (ref 0.8–3.5)
LYMPHOCYTES NFR BLD: 22.3 % (ref 12–49)
MCH RBC QN AUTO: 31.3 PG (ref 26–34)
MCHC RBC AUTO-ENTMCNC: 33.3 G/DL (ref 30–36.5)
MCV RBC AUTO: 93.8 FL (ref 80–99)
MONOCYTES # BLD: 1.04 K/UL (ref 0–1)
MONOCYTES NFR BLD: 13 % (ref 5–13)
NEUTS SEG # BLD: 4.7 K/UL (ref 1.8–8)
NEUTS SEG NFR BLD: 58.6 % (ref 32–75)
NRBC # BLD: 0 K/UL (ref 0–0.01)
NRBC BLD-RTO: 0 PER 100 WBC
PLATELET # BLD AUTO: 268 K/UL (ref 150–400)
PMV BLD AUTO: 10.3 FL (ref 8.9–12.9)
POTASSIUM SERPL-SCNC: 3.6 MMOL/L (ref 3.5–5.1)
RBC # BLD AUTO: 3.07 M/UL (ref 4.1–5.7)
SERVICE CMNT-IMP: ABNORMAL
SODIUM SERPL-SCNC: 141 MMOL/L (ref 136–145)
WBC # BLD AUTO: 8 K/UL (ref 4.1–11.1)

## 2025-06-26 PROCEDURE — 6360000002 HC RX W HCPCS: Performed by: INTERNAL MEDICINE

## 2025-06-26 PROCEDURE — 80048 BASIC METABOLIC PNL TOTAL CA: CPT

## 2025-06-26 PROCEDURE — 82962 GLUCOSE BLOOD TEST: CPT

## 2025-06-26 PROCEDURE — 6370000000 HC RX 637 (ALT 250 FOR IP)

## 2025-06-26 PROCEDURE — 36569 INSJ PICC 5 YR+ W/O IMAGING: CPT

## 2025-06-26 PROCEDURE — 1100000000 HC RM PRIVATE

## 2025-06-26 PROCEDURE — 2580000003 HC RX 258: Performed by: HOSPITALIST

## 2025-06-26 PROCEDURE — 2500000003 HC RX 250 WO HCPCS: Performed by: HOSPITALIST

## 2025-06-26 PROCEDURE — 2580000003 HC RX 258: Performed by: INTERNAL MEDICINE

## 2025-06-26 PROCEDURE — 85025 COMPLETE CBC W/AUTO DIFF WBC: CPT

## 2025-06-26 PROCEDURE — 6370000000 HC RX 637 (ALT 250 FOR IP): Performed by: FAMILY MEDICINE

## 2025-06-26 PROCEDURE — 6370000000 HC RX 637 (ALT 250 FOR IP): Performed by: HOSPITALIST

## 2025-06-26 PROCEDURE — 2500000003 HC RX 250 WO HCPCS: Performed by: FAMILY MEDICINE

## 2025-06-26 RX ORDER — LIDOCAINE HYDROCHLORIDE 10 MG/ML
50 INJECTION, SOLUTION EPIDURAL; INFILTRATION; INTRACAUDAL; PERINEURAL ONCE
Status: DISCONTINUED | OUTPATIENT
Start: 2025-06-26 | End: 2025-06-27 | Stop reason: HOSPADM

## 2025-06-26 RX ORDER — SODIUM CHLORIDE 9 MG/ML
INJECTION, SOLUTION INTRAVENOUS PRN
Status: DISCONTINUED | OUTPATIENT
Start: 2025-06-26 | End: 2025-06-27 | Stop reason: HOSPADM

## 2025-06-26 RX ORDER — SODIUM CHLORIDE 0.9 % (FLUSH) 0.9 %
5-40 SYRINGE (ML) INJECTION PRN
Status: DISCONTINUED | OUTPATIENT
Start: 2025-06-26 | End: 2025-06-27 | Stop reason: HOSPADM

## 2025-06-26 RX ORDER — FUROSEMIDE 40 MG/1
40 TABLET ORAL DAILY
Status: DISCONTINUED | OUTPATIENT
Start: 2025-06-26 | End: 2025-06-27 | Stop reason: HOSPADM

## 2025-06-26 RX ORDER — SODIUM CHLORIDE 0.9 % (FLUSH) 0.9 %
5-40 SYRINGE (ML) INJECTION EVERY 12 HOURS SCHEDULED
Status: DISCONTINUED | OUTPATIENT
Start: 2025-06-26 | End: 2025-06-27 | Stop reason: HOSPADM

## 2025-06-26 RX ADMIN — Medication 5 ML: at 07:35

## 2025-06-26 RX ADMIN — GABAPENTIN 100 MG: 100 CAPSULE ORAL at 22:03

## 2025-06-26 RX ADMIN — SODIUM CHLORIDE: 0.9 INJECTION, SOLUTION INTRAVENOUS at 22:56

## 2025-06-26 RX ADMIN — SODIUM CHLORIDE, PRESERVATIVE FREE 10 ML: 5 INJECTION INTRAVENOUS at 22:04

## 2025-06-26 RX ADMIN — PIPERACILLIN AND TAZOBACTAM 3375 MG: 3; .375 INJECTION, POWDER, LYOPHILIZED, FOR SOLUTION INTRAVENOUS at 22:58

## 2025-06-26 RX ADMIN — Medication 5 ML: at 12:35

## 2025-06-26 RX ADMIN — ATORVASTATIN CALCIUM 10 MG: 10 TABLET, FILM COATED ORAL at 22:03

## 2025-06-26 RX ADMIN — FUROSEMIDE 40 MG: 40 TABLET ORAL at 16:22

## 2025-06-26 RX ADMIN — Medication 5 ML: at 02:11

## 2025-06-26 RX ADMIN — SODIUM CHLORIDE, PRESERVATIVE FREE 10 ML: 5 INJECTION INTRAVENOUS at 08:38

## 2025-06-26 RX ADMIN — Medication 5 ML: at 16:27

## 2025-06-26 RX ADMIN — LEVOTHYROXINE SODIUM 100 MCG: 0.1 TABLET ORAL at 08:37

## 2025-06-26 RX ADMIN — CARVEDILOL 25 MG: 12.5 TABLET, FILM COATED ORAL at 16:27

## 2025-06-26 RX ADMIN — PIPERACILLIN AND TAZOBACTAM 3375 MG: 3; .375 INJECTION, POWDER, LYOPHILIZED, FOR SOLUTION INTRAVENOUS at 05:38

## 2025-06-26 RX ADMIN — CARVEDILOL 25 MG: 12.5 TABLET, FILM COATED ORAL at 08:37

## 2025-06-26 RX ADMIN — PIPERACILLIN AND TAZOBACTAM 3375 MG: 3; .375 INJECTION, POWDER, LYOPHILIZED, FOR SOLUTION INTRAVENOUS at 16:21

## 2025-06-26 RX ADMIN — INSULIN LISPRO 4 UNITS: 100 INJECTION, SOLUTION INTRAVENOUS; SUBCUTANEOUS at 22:03

## 2025-06-26 RX ADMIN — GABAPENTIN 100 MG: 100 CAPSULE ORAL at 08:37

## 2025-06-26 RX ADMIN — INSULIN LISPRO 4 UNITS: 100 INJECTION, SOLUTION INTRAVENOUS; SUBCUTANEOUS at 12:35

## 2025-06-26 RX ADMIN — LEVETIRACETAM 250 MG: 500 TABLET, FILM COATED ORAL at 08:37

## 2025-06-26 RX ADMIN — LEVETIRACETAM 250 MG: 500 TABLET, FILM COATED ORAL at 22:03

## 2025-06-26 RX ADMIN — INSULIN LISPRO 4 UNITS: 100 INJECTION, SOLUTION INTRAVENOUS; SUBCUTANEOUS at 19:21

## 2025-06-26 RX ADMIN — Medication 5 ML: at 22:04

## 2025-06-26 RX ADMIN — FLUOXETINE HYDROCHLORIDE 40 MG: 20 CAPSULE ORAL at 08:37

## 2025-06-26 NOTE — CARE COORDINATION
KATIUSKA- Pending IV infusion order to [facility] and pending 10 home health agencies.    Pt discussed in rounds today. YA is today. This pt has final IV orders. He needs home health PT, OT and SN as well. CM met with pt and he does plan to go home. He is willing to learn how to administer his IV antibx. CM offered choice for home health and IV infusion. He did not have a preference. Cm sent the IV infusion referral to [facility] and sent out 10 home health referrals via [facility]. CM will follow. AZRA Maldonado,ACM-SW

## 2025-06-26 NOTE — PROCEDURES
Ultrasound-guided bedside PICC placement with Runteq 3CG TPS    PRE-PROCEDURE VERIFICATION  Correct Procedure: yes  Correct Site: yes  Temperature: Temp: 97.7 °F (36.5 °C), Temperature Source:    Recent Labs     06/26/25  0546   BUN 10      WBC 8.0       Allergies: Bupropion, Darunavir, Penciclovir, Ciprofloxacin, and Sulfa antibiotics    PROCEDURE DETAIL  A single lumen PICC line was started for long-term antibiotic therapy. The following documentation is in addition to the PICC properties in the lines/airways flowsheet :  Lot #: [id number]  Was xylocaine 1% used intradermally: yes  Arm Circumference 10 cm above AC: 28 cm  Total Catheter Length: 41 cm  External Catheter Length: 0 cm  Vein Selection for PICC: right basilic  Central Line Bundle followed: yes  Complication Related to Insertion: none     The placement was verified by ECG/Sapiens technology: The tip location is in the atrial/caval superior vena cava. See ECG results for PICC tip placement.    Report given to nurse    Line is okay to use.    Francisco Rosario RN

## 2025-06-26 NOTE — CARE COORDINATION
KATIUSKA- Pending referrals to 3 SNF's.  Pt will need insurance auth.  Crys with [employer] spoke with this pt's daughter and she was told that this pt's home is in disarray.He is having renovations done and his bathroom and kitchen have not been completed. She said that there is no way that he can go to his home at d/c. CM met with pt to discuss. He did agree that he is unable to go home. SNF discussed and choice offered. He would like referrals to be sent to [facility], [facility] and [facility]. Cm did send referrals to these facilities via [facility]. CM updated the attending.CM also sent messages to [employer] asking them to start insurance auth. Pt's daughter updated via voice mail message. AZRA Maldonado,ACM-SW

## 2025-06-26 NOTE — CARE COORDINATION
KATIUSKA-D/c to home with [facility] did not get any accepting home health agencies. ARMIN sent more referrals out with no accepting facilities. Pt will be unable to get home health PT and OT.    ARMIN spoke with Crys with [facility] and she stated that her agency can do the nursing for this pt.She is going to complete teaching with this pt today.Janell Abrams,NICHELLEW,ACM-SW

## 2025-06-26 NOTE — PROGRESS NOTES
Hospitalist Progress Note  Lonnie Swartz MD  Answering service: [phone]        Date of Service:  2025  NAME:  Harman Streeter  :  1940  MRN:  [id number]      Admission Summary:     Patient transferred from [facility] with empyema.    Interval history / Subjective:   Follow up Lung abscess   Spoke to the patient earlier and he was excited about going home. At that time he mentioned that he would be able to take care of himself and administer IV antibiotics  Awaiting PICC line    Later:  CM messaged that the daughter does not think that the patient would be able to take care of himself safely  Assessment & Plan:     Lung abscess  Leukocytosis--resolved  - Patient with recent admission at [facility] for pneumonia presented with persistent symptoms of chest pain, shortness of breath and cough  - CTA of the chest shows a fluid hyperdense level with loculated right pleural effusion, initially was concern for empyema however after radiology reviewed with thoracic surgery, more likely lung abscess  -Per thoracic surgery, recommended nonoperative management with IV antibiotic  - On Zosyn, continue for 8 weeks through   - Appreciate ID and thoracic surgery     OSMAN: now resolved  Thrombocytosis: Resolved  Hypertension: Continue Coreg. Restart lasix  Diabetes type 2: on SSI  Dyslipidemia: Continue statin  Hypothyroidism: Continue Synthroid  Seizure disorder: Continue Keppra  Mood disorder: Continue Prozac     Code status: Full  Prophylaxis: Lovenox    Plan: Continue IV antibiotics. Medically stable, awaiting placement  Care Plan discussed with: Patient and care team  Anticipated Disposition: ?tomorrow  Central Line:   None             Review of Systems:   Pertinent items are noted in HPI.         Vital Signs:    Last 24hrs VS reviewed since prior progress note. Most recent are:  Vitals:

## 2025-06-27 ENCOUNTER — APPOINTMENT (OUTPATIENT)
Facility: HOSPITAL | Age: 85
End: 2025-06-27
Attending: INTERNAL MEDICINE
Payer: MEDICARE

## 2025-06-27 ENCOUNTER — TELEPHONE (OUTPATIENT)
Age: 85
End: 2025-06-27

## 2025-06-27 VITALS
HEIGHT: 65 IN | SYSTOLIC BLOOD PRESSURE: 163 MMHG | HEART RATE: 79 BPM | RESPIRATION RATE: 18 BRPM | BODY MASS INDEX: 27.88 KG/M2 | OXYGEN SATURATION: 93 % | WEIGHT: 167.33 LBS | DIASTOLIC BLOOD PRESSURE: 66 MMHG | TEMPERATURE: 98.4 F

## 2025-06-27 DIAGNOSIS — J86.9 EMPYEMA LUNG (HCC): Primary | ICD-10-CM

## 2025-06-27 LAB
ANION GAP SERPL CALC-SCNC: 10 MMOL/L (ref 2–12)
BASOPHILS # BLD: 0.11 K/UL (ref 0–0.1)
BASOPHILS NFR BLD: 1.1 % (ref 0–1)
BUN SERPL-MCNC: 11 MG/DL (ref 6–20)
BUN/CREAT SERPL: 12 (ref 12–20)
CALCIUM SERPL-MCNC: 8 MG/DL (ref 8.5–10.1)
CHLORIDE SERPL-SCNC: 108 MMOL/L (ref 97–108)
CO2 SERPL-SCNC: 23 MMOL/L (ref 21–32)
CREAT SERPL-MCNC: 0.93 MG/DL (ref 0.7–1.3)
DIFFERENTIAL METHOD BLD: ABNORMAL
EOSINOPHIL # BLD: 0.31 K/UL (ref 0–0.4)
EOSINOPHIL NFR BLD: 3.2 % (ref 0–7)
ERYTHROCYTE [DISTWIDTH] IN BLOOD BY AUTOMATED COUNT: 13.7 % (ref 11.5–14.5)
GLUCOSE BLD STRIP.AUTO-MCNC: 181 MG/DL (ref 65–117)
GLUCOSE BLD STRIP.AUTO-MCNC: 262 MG/DL (ref 65–117)
GLUCOSE BLD STRIP.AUTO-MCNC: 277 MG/DL (ref 65–117)
GLUCOSE SERPL-MCNC: 138 MG/DL (ref 65–100)
HCT VFR BLD AUTO: 29 % (ref 36.6–50.3)
HGB BLD-MCNC: 10 G/DL (ref 12.1–17)
IMM GRANULOCYTES # BLD AUTO: 0.06 K/UL (ref 0–0.04)
IMM GRANULOCYTES NFR BLD AUTO: 0.6 % (ref 0–0.5)
LYMPHOCYTES # BLD: 1.87 K/UL (ref 0.8–3.5)
LYMPHOCYTES NFR BLD: 19.4 % (ref 12–49)
MCH RBC QN AUTO: 31.6 PG (ref 26–34)
MCHC RBC AUTO-ENTMCNC: 34.5 G/DL (ref 30–36.5)
MCV RBC AUTO: 91.8 FL (ref 80–99)
MONOCYTES # BLD: 1.32 K/UL (ref 0–1)
MONOCYTES NFR BLD: 13.7 % (ref 5–13)
NEUTS SEG # BLD: 5.97 K/UL (ref 1.8–8)
NEUTS SEG NFR BLD: 62 % (ref 32–75)
NRBC # BLD: 0 K/UL (ref 0–0.01)
NRBC BLD-RTO: 0 PER 100 WBC
PLATELET # BLD AUTO: 266 K/UL (ref 150–400)
PMV BLD AUTO: 10.3 FL (ref 8.9–12.9)
POTASSIUM SERPL-SCNC: 3.6 MMOL/L (ref 3.5–5.1)
RBC # BLD AUTO: 3.16 M/UL (ref 4.1–5.7)
SERVICE CMNT-IMP: ABNORMAL
SODIUM SERPL-SCNC: 141 MMOL/L (ref 136–145)
WBC # BLD AUTO: 9.6 K/UL (ref 4.1–11.1)

## 2025-06-27 PROCEDURE — 6370000000 HC RX 637 (ALT 250 FOR IP): Performed by: FAMILY MEDICINE

## 2025-06-27 PROCEDURE — 80048 BASIC METABOLIC PNL TOTAL CA: CPT

## 2025-06-27 PROCEDURE — 6370000000 HC RX 637 (ALT 250 FOR IP): Performed by: HOSPITALIST

## 2025-06-27 PROCEDURE — 82962 GLUCOSE BLOOD TEST: CPT

## 2025-06-27 PROCEDURE — 71045 X-RAY EXAM CHEST 1 VIEW: CPT

## 2025-06-27 PROCEDURE — 85025 COMPLETE CBC W/AUTO DIFF WBC: CPT

## 2025-06-27 PROCEDURE — 6360000002 HC RX W HCPCS: Performed by: INTERNAL MEDICINE

## 2025-06-27 PROCEDURE — 2580000003 HC RX 258: Performed by: INTERNAL MEDICINE

## 2025-06-27 PROCEDURE — 2500000003 HC RX 250 WO HCPCS: Performed by: HOSPITALIST

## 2025-06-27 PROCEDURE — 6370000000 HC RX 637 (ALT 250 FOR IP)

## 2025-06-27 RX ORDER — GABAPENTIN 100 MG/1
100 CAPSULE ORAL 2 TIMES DAILY
Qty: 90 CAPSULE | Refills: 3 | Status: SHIPPED | OUTPATIENT
Start: 2025-06-27 | End: 2025-07-27

## 2025-06-27 RX ORDER — FLUOXETINE HYDROCHLORIDE 40 MG/1
40 CAPSULE ORAL DAILY
Qty: 30 CAPSULE | Refills: 0 | Status: SHIPPED | OUTPATIENT
Start: 2025-06-27

## 2025-06-27 RX ORDER — TAZOBACTAM SODIUM AND PIPERACILLIN SODIUM 375; 3 MG/50ML; G/50ML
3375 INJECTION, SOLUTION INTRAVENOUS EVERY 8 HOURS
Qty: 4500 ML | Refills: 1 | Status: SHIPPED
Start: 2025-06-27 | End: 2025-08-16

## 2025-06-27 RX ORDER — LEVETIRACETAM 250 MG/1
250 TABLET ORAL 2 TIMES DAILY
Qty: 60 TABLET | Refills: 3 | Status: SHIPPED | OUTPATIENT
Start: 2025-06-27

## 2025-06-27 RX ADMIN — FLUOXETINE HYDROCHLORIDE 40 MG: 20 CAPSULE ORAL at 08:51

## 2025-06-27 RX ADMIN — LEVETIRACETAM 250 MG: 500 TABLET, FILM COATED ORAL at 08:52

## 2025-06-27 RX ADMIN — PIPERACILLIN AND TAZOBACTAM 3375 MG: 3; .375 INJECTION, POWDER, LYOPHILIZED, FOR SOLUTION INTRAVENOUS at 13:17

## 2025-06-27 RX ADMIN — INSULIN LISPRO 4 UNITS: 100 INJECTION, SOLUTION INTRAVENOUS; SUBCUTANEOUS at 13:02

## 2025-06-27 RX ADMIN — LEVOTHYROXINE SODIUM 100 MCG: 0.1 TABLET ORAL at 08:51

## 2025-06-27 RX ADMIN — CARVEDILOL 25 MG: 12.5 TABLET, FILM COATED ORAL at 08:51

## 2025-06-27 RX ADMIN — FUROSEMIDE 40 MG: 40 TABLET ORAL at 08:52

## 2025-06-27 RX ADMIN — INSULIN LISPRO 2 UNITS: 100 INJECTION, SOLUTION INTRAVENOUS; SUBCUTANEOUS at 08:55

## 2025-06-27 RX ADMIN — PIPERACILLIN AND TAZOBACTAM 3375 MG: 3; .375 INJECTION, POWDER, LYOPHILIZED, FOR SOLUTION INTRAVENOUS at 05:43

## 2025-06-27 RX ADMIN — Medication 5 ML: at 05:43

## 2025-06-27 RX ADMIN — SODIUM CHLORIDE, PRESERVATIVE FREE 10 ML: 5 INJECTION INTRAVENOUS at 08:56

## 2025-06-27 RX ADMIN — GABAPENTIN 100 MG: 100 CAPSULE ORAL at 08:52

## 2025-06-27 NOTE — DISCHARGE INSTRUCTIONS
Discharge SNF/Rehab Instructions/LTAC       PATIENT ID: Harman Streeter  MRN: [id number]   YOB: 1940    DATE OF ADMISSION: [unfilled]    DATE OF DISCHARGE: 6/27/2025    PRIMARY CARE PROVIDER: @PCP@       ATTENDING PHYSICIAN: [unfilled]  DISCHARGING PROVIDER: Lonnie Swartz MD     To contact this individual call [phone] and ask the  to page.   If unavailable ask to be transferred the Adult Hospitalist Department.    CONSULTATIONS: [unfilled]    PROCEDURES/SURGERIES: * No surgery found *    ADMITTING DIAGNOSES & HOSPITAL COURSE:   Lung abscess  Leukocytosis--resolved  - Patient with recent admission at [facility] for pneumonia presented with persistent symptoms of chest pain, shortness of breath and cough  - CTA of the chest shows a fluid hyperdense level with loculated right pleural effusion, initially was concern for empyema however after radiology reviewed with thoracic surgery, more likely lung abscess  -Per thoracic surgery, recommended nonoperative management with IV antibiotic  - On Zosyn, continue for 8 weeks through 8/16  - Appreciate ID and thoracic surgery     OSMAN: now resolved  Thrombocytosis: Resolved  Hypertension: Continue Coreg. Restart lasix  Diabetes type 2: on SSI  Dyslipidemia: Continue statin  Hypothyroidism: Continue Synthroid  Seizure disorder: Continue Keppra  Mood disorder: Continue Prozac      PENDING TEST RESULTS:   At the time of discharge the following test results are still pending: none    FOLLOW UP APPOINTMENTS:    PCP  Infectious Disease       ADDITIONAL CARE RECOMMENDATIONS:   Per ID:  Diagnosis: Empyema     Antibiotic: Zosyn IV 3.375 g Q 8 hrs or administered continuous via infusion pump through 8/16/25, inclusive     PICC line insertion for outpatient antibiotic.      Routine PICC/Rachael/ Portacath Care including PRN catheter flow management     Weekly labs with results fax to # [phone]. Call critical lab values to [phone].   [x] CBC

## 2025-06-27 NOTE — CARE COORDINATION
Transition of Care Plan:    RUR: 9% Low   Prior Level of Functioning:   Disposition:   YA: Fri. 6/27  If SNF or IPR: Date FOC offered: 6/26/25  Date FOC received: 6/26/25  Accepting facility: [facility]   Date authorization started with reference number: Fri. 6/27  Date authorization received and expires: Fri 6/27 - expires Tues. 7/1  Follow up appointments: PCP  DME needed: Defer to SNF   Transportation at discharge: [facility] stretcher transport today Fri. 6/27 @ 5PM   IM/IMM Medicare/ letter given: 1st IM: 6/23 & 2nd IM: 6/26  Is patient a  and connected with VA? NA  Caregiver Contact: Daughter, Pamela   Discharge Caregiver contacted prior to discharge? Yes, made aware  Care Conference needed? No  Barriers to discharge: None    CM reviewed chart. Plan for patient to DC to [facility] today Fri. 6/27 @ 5PM with [facility] stretcher transport.     Daughter aware         Insurance authorization initiated today, Fri. 6/27 and approved same day. Expires on Tues. 7/1. Authorization #: [id number]

## 2025-06-27 NOTE — PLAN OF CARE
Problem: Chronic Conditions and Co-morbidities  Goal: Patient's chronic conditions and co-morbidity symptoms are monitored and maintained or improved  6/27/2025 1003 by Sally Zhang RN  Outcome: Progressing  6/26/2025 2343 by Leilani Arreguin RN  Outcome: Progressing     Problem: Discharge Planning  Goal: Discharge to home or other facility with appropriate resources  6/27/2025 1003 by Sally Zhang RN  Outcome: Progressing  6/26/2025 2343 by Leilani Arreguin RN  Outcome: Progressing     Problem: Safety - Adult  Goal: Free from fall injury  6/27/2025 1003 by Sally Zhang RN  Outcome: Progressing  6/26/2025 2343 by Leilani Arreguin RN  Outcome: Progressing

## 2025-06-27 NOTE — CARE COORDINATION
SNF authorization is pending for [facility] on the Home & Community Transitions portal.    Auth reference number is [id number].  Requested start date of today as pt is ready for discharge.    Ivy Garza LCSW [facility]  Care Manager   [phone]

## 2025-06-27 NOTE — FLOWSHEET NOTE
06/27/25 1639   AVS Reviewed   AVS & discharge instructions reviewed with patient and/or representative? Yes   Reviewed instructions with Patient   Level of Understanding Questions answered;Verbalized understanding     RN called report to [facility]. Pt to be transferred by [facility]. RN gave report to [facility]. [facility] took pt and 1 pt belonging bag.

## 2025-06-27 NOTE — PLAN OF CARE
Problem: Discharge Planning  Goal: Discharge to home or other facility with appropriate resources  6/26/2025 2343 by Leilani Arreguin, RN  Outcome: Progressing     Problem: Safety - Adult  Goal: Free from fall injury  6/26/2025 2343 by Leilani Arreguin RN  Outcome: Progressing     Problem: Chronic Conditions and Co-morbidities  Goal: Patient's chronic conditions and co-morbidity symptoms are monitored and maintained or improved  6/26/2025 2343 by Leilani Arreguin, RN  Outcome: Progressing

## 2025-06-27 NOTE — DISCHARGE SUMMARY
Discharge Summary       PATIENT ID: Harman Streeter  MRN: [id number]   YOB: 1940    DATE OF ADMISSION: 6/22/2025  4:19 PM    DATE OF DISCHARGE: 6/27/2025   PRIMARY CARE PROVIDER: PHYLLIS Mathis MD     ATTENDING PHYSICIAN: Dr Lonnie Swartz  DISCHARGING PROVIDER: Lonnie Swartz MD    To contact this individual call [phone] and ask the  to page.  If unavailable ask to be transferred the Adult Hospitalist Department.    CONSULTATIONS: IP CONSULT TO THORACIC SURGERY  IP CONSULT TO INFECTIOUS DISEASES  IP CONSULT TO PHARMACY  IP CONSULT TO PALLIATIVE CARE  IP CONSULT TO CASE MANAGEMENT  IP CONSULT TO VASCULAR ACCESS TEAM    PROCEDURES/SURGERIES: * No surgery found *    ADMITTING DIAGNOSES & HOSPITAL COURSE:   Lung abscess  Leukocytosis--resolved  - Patient with recent admission at [facility] for pneumonia presented with persistent symptoms of chest pain, shortness of breath and cough  - CTA of the chest shows a fluid hyperdense level with loculated right pleural effusion, initially was concern for empyema however after radiology reviewed with thoracic surgery, more likely lung abscess  -Per thoracic surgery, recommended nonoperative management with IV antibiotic  - On Zosyn, continue for 8 weeks through 8/16  - Appreciate ID and thoracic surgery     OSMAN: now resolved  Thrombocytosis: Resolved  Hypertension: Continue Coreg. Restart lasix  Diabetes type 2: on SSI  Dyslipidemia: Continue statin  Hypothyroidism: Continue Synthroid  Seizure disorder: Continue Keppra  Mood disorder: Continue Prozac      PENDING TEST RESULTS:   At the time of discharge the following test results are still pending: none    FOLLOW UP APPOINTMENTS:    PCP  Infectious Disease       ADDITIONAL CARE RECOMMENDATIONS:   Per ID:  Diagnosis: Empyema     Antibiotic: Zosyn IV 3.375 g Q 8 hrs or administered continuous via infusion pump through 8/16/25, inclusive     PICC line insertion for outpatient antibiotic.       Routine PICC/Rachael/ Portacath Care including PRN catheter flow management     Weekly labs with results fax to # [phone]. Call critical lab values to [phone].   [x] CBC w/diff  [x] BUN/Creatinine  [x] AST, ALT  [] CPK  [] CRP        Do NOT pull PICC line after last dose. Call Infectious Disease @ [phone]     May send to IR for line evaluation or replacement PRN Phone # [phone]     Follow up with Infectious Disease. Repeat CT chest with contrast in 4 weeks after 7/20/25       DIET: cardiac diet    ACTIVITY: activity as tolerated      DISCHARGE MEDICATIONS:   See Medication Reconciliation Form      NOTIFY YOUR PHYSICIAN FOR ANY OF THE FOLLOWING:   Fever over 101 degrees for 24 hours.   Chest pain, shortness of breath, fever, chills, nausea, vomiting, diarrhea, change in mentation, falling, weakness, bleeding. Severe pain or pain not relieved by medications.  Or, any other signs or symptoms that you may have questions about.    DISPOSITION:    Home With:   OT  PT  HH  RN      x SNF/Inpatient Rehab/LTAC    Independent/assisted living    Hospice    Other:       PATIENT CONDITION AT DISCHARGE:     Functional status    Poor    x Deconditioned     Independent      Cognition    x Lucid     Forgetful     Dementia      Catheters/lines (plus indication)    Preciado    x PICC     PEG     None      Code status    x Full code     DNR      PHYSICAL EXAMINATION AT DISCHARGE:  Please see progress note        DISCHARGE MEDICATIONS:     Medication List        START taking these medications      Zosyn 3-0.375 GM/50ML IVPB  Generic drug: piperacillin-tazobactam  Infuse 50 mLs intravenously in the morning and 50 mLs at noon and 50 mLs in the evening.            CONTINUE taking these medications      aspirin 81 MG chewable tablet     atorvastatin 10 MG tablet  Commonly known as: LIPITOR     carvedilol 25 MG tablet  Commonly known as: COREG     diphenoxylate-atropine 2.5-0.025 MG per tablet  Commonly known as: LOMOTIL

## 2025-06-27 NOTE — PROGRESS NOTES
Hospitalist Progress Note  Lonnie Swartz MD  Answering service: [phone]        Date of Service:  2025  NAME:  Harman Streeter  :  1940  MRN:  [id number]      Admission Summary:     Patient transferred from [facility] with empyema.    Interval history / Subjective:   Follow up Lung abscess   S/p PICC  Comfortably laying in bed  Assessment & Plan:     Lung abscess  Leukocytosis--resolved  - Patient with recent admission at [facility] for pneumonia presented with persistent symptoms of chest pain, shortness of breath and cough  - CTA of the chest shows a fluid hyperdense level with loculated right pleural effusion, initially was concern for empyema however after radiology reviewed with thoracic surgery, more likely lung abscess  -Per thoracic surgery, recommended nonoperative management with IV antibiotic  - On Zosyn, continue for 8 weeks through   - Appreciate ID and thoracic surgery     OSMAN: now resolved  Thrombocytosis: Resolved  Hypertension: Continue Coreg. Restart lasix  Diabetes type 2: on SSI  Dyslipidemia: Continue statin  Hypothyroidism: Continue Synthroid  Seizure disorder: Continue Keppra  Mood disorder: Continue Prozac     Code status: Full  Prophylaxis: Lovenox    Plan: Continue IV antibiotics. Medically stable, awaiting placement  Care Plan discussed with: Patient and care team  Anticipated Disposition: ?today  Central Line:   None             Review of Systems:   Pertinent items are noted in HPI.         Vital Signs:    Last 24hrs VS reviewed since prior progress note. Most recent are:  Vitals:    25 0851   BP: (!) 163/66   Pulse: 79   Resp:    Temp:    SpO2:          Intake/Output Summary (Last 24 hours) at 2025 0949  Last data filed at 2025 0541  Gross per 24 hour   Intake 480 ml   Output 300 ml   Net 180 ml        Physical Examination:     I had

## 2025-06-27 NOTE — PROGRESS NOTES
Report called to TSER[facility] on 6E. Report consisted of SBAR, vitals, labs, procedures, and plan. An opportunity was given to ask questions.

## 2025-06-30 NOTE — TELEPHONE ENCOUNTER
Micki Love APRN - NP  [facility] Int Med Clinical Staff3 days ago       Please schedule in 5-8 weeks

## 2025-07-23 ENCOUNTER — HOSPITAL ENCOUNTER (OUTPATIENT)
Facility: HOSPITAL | Age: 85
Discharge: HOME | End: 2025-07-26
Payer: MEDICARE

## 2025-07-23 DIAGNOSIS — J86.9 EMPYEMA (HCC): ICD-10-CM

## 2025-07-23 PROCEDURE — 71250 CT THORAX DX C-: CPT

## (undated) DEVICE — REM POLYHESIVE ADULT PATIENT RETURN ELECTRODE: Brand: VALLEYLAB

## (undated) DEVICE — AIRLIFE™ U/CONNECT-IT OXYGEN TUBING 7 FEET (2.1 M) CRUSH-RESISTANT OXYGEN TUBING, VINYL TIPPED: Brand: AIRLIFE™

## (undated) DEVICE — NEONATAL-ADULT SPO2 SENSOR: Brand: NELLCOR

## (undated) DEVICE — WRISTBAND ID AD W1XL11.5IN RED POLY ALRG PREPRINTED PERM

## (undated) DEVICE — SNARE ENDOSCP M L240CM W27MM SHTH DIA2.4MM CHN 2.8MM OVL

## (undated) DEVICE — Z DISCONTINUED NO SUB IDED SET EXTN W/ 4 W STPCOCK M SPIN LOK 36IN

## (undated) DEVICE — 1200 GUARD II KIT W/5MM TUBE W/O VAC TUBE: Brand: GUARDIAN

## (undated) DEVICE — TRAP SURG QUAD PARABOLA SLOT DSGN SFTY SCRN TRAPEASE

## (undated) DEVICE — Device

## (undated) DEVICE — SOLIDIFIER FLUID 3000 CC ABSORB

## (undated) DEVICE — CONNECTOR TBNG AUX H2O JET DISP FOR OLY 160/180 SER

## (undated) DEVICE — BW-412T DISP COMBO CLEANING BRUSH: Brand: SINGLE USE COMBINATION CLEANING BRUSH

## (undated) DEVICE — BAG SPEC BIOHZD LF 2MIL 6X10IN -- CONVERT TO ITEM 357326

## (undated) DEVICE — KENDALL RADIOLUCENT FOAM MONITORING ELECTRODE -RECTANGULAR SHAPE: Brand: KENDALL

## (undated) DEVICE — QUILTED PREMIUM COMFORT UNDERPAD,EXTRA HEAVY: Brand: WINGS

## (undated) DEVICE — Device: Brand: MEDICAL ACTION INDUSTRIES

## (undated) DEVICE — BAG BELONG PT PERS CLEAR HANDL

## (undated) DEVICE — NEEDLE HYPO 18GA L1.5IN PNK S STL HUB POLYPR SHLD REG BVL

## (undated) DEVICE — SYR 50ML SLIP TIP NSAF LF STRL --

## (undated) DEVICE — CANN NASAL O2 CAPNOGRAPHY AD -- FILTERLINE

## (undated) DEVICE — CONTAINER SPEC 20 ML LID NEUT BUFF FORMALIN 10 % POLYPR STS

## (undated) DEVICE — SYRINGE MED 20ML STD CLR PLAS LUERLOCK TIP N CTRL DISP

## (undated) DEVICE — ENDO CARRY-ON PROCEDURE KIT INCLUDES ENZYMATIC SPONGE, GAUZE, BIOHAZARD LABEL, TRAY, LUBRICANT, DIRTY SCOPE LABEL, WATER LABEL, TRAY, DRAWSTRING PAD, AND DEFENDO 4-PIECE KIT.: Brand: ENDO CARRY-ON PROCEDURE KIT

## (undated) DEVICE — SET ADMIN 16ML TBNG L100IN 2 Y INJ SITE IV PIGGY BK DISP

## (undated) DEVICE — CATH IV AUTOGRD BC BLU 22GA 25 -- INSYTE

## (undated) DEVICE — Z DISCONTINUED USE 2751540 TUBING IRRIG L10IN DISP PMP ENDOGATOR